# Patient Record
Sex: FEMALE | Race: WHITE | Employment: FULL TIME | URBAN - METROPOLITAN AREA
[De-identification: names, ages, dates, MRNs, and addresses within clinical notes are randomized per-mention and may not be internally consistent; named-entity substitution may affect disease eponyms.]

---

## 2017-03-13 ENCOUNTER — APPOINTMENT (OUTPATIENT)
Dept: CT IMAGING | Age: 47
End: 2017-03-13
Payer: COMMERCIAL

## 2017-03-13 ENCOUNTER — HOSPITAL ENCOUNTER (EMERGENCY)
Age: 47
Discharge: HOME | End: 2017-03-13
Attending: EMERGENCY MEDICINE
Payer: COMMERCIAL

## 2017-03-13 VITALS
HEART RATE: 74 BPM | HEIGHT: 62 IN | BODY MASS INDEX: 25.95 KG/M2 | OXYGEN SATURATION: 100 % | WEIGHT: 141 LBS | DIASTOLIC BLOOD PRESSURE: 80 MMHG | SYSTOLIC BLOOD PRESSURE: 134 MMHG | RESPIRATION RATE: 20 BRPM | TEMPERATURE: 98.9 F

## 2017-03-13 DIAGNOSIS — S09.90XA CLOSED HEAD INJURY, INITIAL ENCOUNTER: Primary | ICD-10-CM

## 2017-03-13 PROCEDURE — 99283 EMERGENCY DEPT VISIT LOW MDM: CPT

## 2017-03-13 PROCEDURE — 70450 CT HEAD/BRAIN W/O DYE: CPT

## 2017-03-13 PROCEDURE — 6360000002 HC RX W HCPCS: Performed by: EMERGENCY MEDICINE

## 2017-03-13 RX ORDER — ONDANSETRON 4 MG/1
4 TABLET, ORALLY DISINTEGRATING ORAL ONCE
Status: COMPLETED | OUTPATIENT
Start: 2017-03-13 | End: 2017-03-13

## 2017-03-13 RX ORDER — ONDANSETRON 4 MG/1
4 TABLET, ORALLY DISINTEGRATING ORAL EVERY 8 HOURS PRN
Qty: 10 TABLET | Refills: 0 | Status: SHIPPED | OUTPATIENT
Start: 2017-03-13 | End: 2018-08-01

## 2017-03-13 RX ADMIN — ONDANSETRON 4 MG: 4 TABLET, ORALLY DISINTEGRATING ORAL at 14:17

## 2017-03-13 ASSESSMENT — ENCOUNTER SYMPTOMS
EYE REDNESS: 0
SHORTNESS OF BREATH: 0
WHEEZING: 0
TROUBLE SWALLOWING: 0
SORE THROAT: 0
COUGH: 0
ABDOMINAL PAIN: 0
CHEST TIGHTNESS: 0
VOICE CHANGE: 0
CONSTIPATION: 0
CHOKING: 0
FACIAL SWELLING: 0
EYE PAIN: 0
BACK PAIN: 0
BLOOD IN STOOL: 0
SINUS PRESSURE: 0
DIARRHEA: 0
VOMITING: 0
EYE DISCHARGE: 0
STRIDOR: 0

## 2017-03-13 ASSESSMENT — PAIN DESCRIPTION - DESCRIPTORS: DESCRIPTORS: ACHING;DULL

## 2017-03-13 ASSESSMENT — PAIN DESCRIPTION - PAIN TYPE: TYPE: ACUTE PAIN

## 2017-03-13 ASSESSMENT — PAIN DESCRIPTION - FREQUENCY: FREQUENCY: CONTINUOUS

## 2017-03-13 ASSESSMENT — PAIN SCALES - GENERAL
PAINLEVEL_OUTOF10: 6
PAINLEVEL_OUTOF10: 1

## 2017-03-13 ASSESSMENT — PAIN DESCRIPTION - ONSET: ONSET: SUDDEN

## 2017-03-13 ASSESSMENT — PAIN DESCRIPTION - ORIENTATION: ORIENTATION: ANTERIOR

## 2017-03-13 ASSESSMENT — PAIN DESCRIPTION - PROGRESSION: CLINICAL_PROGRESSION: NOT CHANGED

## 2017-03-13 ASSESSMENT — PAIN DESCRIPTION - LOCATION
LOCATION: HEAD
LOCATION: HEAD

## 2017-03-15 ENCOUNTER — OFFICE VISIT (OUTPATIENT)
Dept: FAMILY MEDICINE CLINIC | Age: 47
End: 2017-03-15

## 2017-03-15 VITALS
RESPIRATION RATE: 16 BRPM | DIASTOLIC BLOOD PRESSURE: 78 MMHG | WEIGHT: 146 LBS | SYSTOLIC BLOOD PRESSURE: 122 MMHG | BODY MASS INDEX: 24.32 KG/M2 | HEIGHT: 65 IN | OXYGEN SATURATION: 99 % | HEART RATE: 70 BPM

## 2017-03-15 DIAGNOSIS — F07.81 POST-CONCUSSION SYNDROME: Primary | ICD-10-CM

## 2017-03-15 PROCEDURE — 99213 OFFICE O/P EST LOW 20 MIN: CPT | Performed by: PHYSICIAN ASSISTANT

## 2017-03-15 RX ORDER — MECLIZINE HYDROCHLORIDE 25 MG/1
25 TABLET ORAL 3 TIMES DAILY PRN
Qty: 30 TABLET | Refills: 1 | Status: SHIPPED | OUTPATIENT
Start: 2017-03-15 | End: 2017-03-25

## 2017-03-15 ASSESSMENT — ENCOUNTER SYMPTOMS
SHORTNESS OF BREATH: 0
COUGH: 0
EYE PAIN: 0

## 2017-06-07 ENCOUNTER — OFFICE VISIT (OUTPATIENT)
Dept: FAMILY MEDICINE CLINIC | Age: 47
End: 2017-06-07

## 2017-06-07 VITALS
BODY MASS INDEX: 25.58 KG/M2 | HEART RATE: 70 BPM | DIASTOLIC BLOOD PRESSURE: 70 MMHG | OXYGEN SATURATION: 99 % | WEIGHT: 139 LBS | SYSTOLIC BLOOD PRESSURE: 110 MMHG | HEIGHT: 62 IN

## 2017-06-07 DIAGNOSIS — M65.4 DE QUERVAIN'S TENOSYNOVITIS, RIGHT: Primary | ICD-10-CM

## 2017-06-07 PROCEDURE — 99213 OFFICE O/P EST LOW 20 MIN: CPT | Performed by: FAMILY MEDICINE

## 2017-06-07 ASSESSMENT — ENCOUNTER SYMPTOMS
ABDOMINAL PAIN: 0
CONSTIPATION: 0
COUGH: 0
DIARRHEA: 0
SORE THROAT: 0
SHORTNESS OF BREATH: 0
WHEEZING: 0
RHINORRHEA: 0

## 2017-06-21 ENCOUNTER — OFFICE VISIT (OUTPATIENT)
Dept: FAMILY MEDICINE CLINIC | Age: 47
End: 2017-06-21

## 2017-06-21 VITALS
BODY MASS INDEX: 25.95 KG/M2 | SYSTOLIC BLOOD PRESSURE: 102 MMHG | DIASTOLIC BLOOD PRESSURE: 60 MMHG | WEIGHT: 141 LBS | HEART RATE: 79 BPM | HEIGHT: 62 IN | OXYGEN SATURATION: 100 %

## 2017-06-21 DIAGNOSIS — M65.4 DE QUERVAIN'S TENOSYNOVITIS: Primary | ICD-10-CM

## 2017-06-21 PROCEDURE — 20605 DRAIN/INJ JOINT/BURSA W/O US: CPT | Performed by: FAMILY MEDICINE

## 2017-06-21 PROCEDURE — 99213 OFFICE O/P EST LOW 20 MIN: CPT | Performed by: FAMILY MEDICINE

## 2017-06-21 RX ORDER — TRIAMCINOLONE ACETONIDE 40 MG/ML
40 INJECTION, SUSPENSION INTRA-ARTICULAR; INTRAMUSCULAR ONCE
Status: SHIPPED | OUTPATIENT
Start: 2017-06-21

## 2017-07-10 ENCOUNTER — TELEPHONE (OUTPATIENT)
Dept: FAMILY MEDICINE CLINIC | Age: 47
End: 2017-07-10

## 2017-07-12 ENCOUNTER — OFFICE VISIT (OUTPATIENT)
Dept: FAMILY MEDICINE CLINIC | Age: 47
End: 2017-07-12

## 2017-07-12 VITALS
DIASTOLIC BLOOD PRESSURE: 68 MMHG | BODY MASS INDEX: 26.16 KG/M2 | TEMPERATURE: 98.7 F | SYSTOLIC BLOOD PRESSURE: 108 MMHG | WEIGHT: 143 LBS | OXYGEN SATURATION: 97 % | HEART RATE: 72 BPM

## 2017-07-12 DIAGNOSIS — R42 DIZZINESS: Primary | ICD-10-CM

## 2017-07-12 PROCEDURE — 99213 OFFICE O/P EST LOW 20 MIN: CPT | Performed by: FAMILY MEDICINE

## 2017-07-12 ASSESSMENT — ENCOUNTER SYMPTOMS
DIARRHEA: 0
RHINORRHEA: 0
COUGH: 0
ABDOMINAL PAIN: 0
WHEEZING: 0
SHORTNESS OF BREATH: 1
CONSTIPATION: 0
SORE THROAT: 0

## 2017-07-19 ENCOUNTER — TELEPHONE (OUTPATIENT)
Dept: FAMILY MEDICINE CLINIC | Age: 47
End: 2017-07-19

## 2018-03-13 ENCOUNTER — OFFICE VISIT (OUTPATIENT)
Dept: FAMILY MEDICINE CLINIC | Age: 48
End: 2018-03-13
Payer: COMMERCIAL

## 2018-03-13 VITALS
BODY MASS INDEX: 27.46 KG/M2 | HEIGHT: 62 IN | DIASTOLIC BLOOD PRESSURE: 76 MMHG | OXYGEN SATURATION: 97 % | TEMPERATURE: 98.1 F | WEIGHT: 149.2 LBS | HEART RATE: 83 BPM | SYSTOLIC BLOOD PRESSURE: 110 MMHG

## 2018-03-13 DIAGNOSIS — M25.562 ACUTE PAIN OF LEFT KNEE: Primary | ICD-10-CM

## 2018-03-13 PROCEDURE — G8419 CALC BMI OUT NRM PARAM NOF/U: HCPCS | Performed by: FAMILY MEDICINE

## 2018-03-13 PROCEDURE — G8427 DOCREV CUR MEDS BY ELIG CLIN: HCPCS | Performed by: FAMILY MEDICINE

## 2018-03-13 PROCEDURE — G8484 FLU IMMUNIZE NO ADMIN: HCPCS | Performed by: FAMILY MEDICINE

## 2018-03-13 PROCEDURE — 99213 OFFICE O/P EST LOW 20 MIN: CPT | Performed by: FAMILY MEDICINE

## 2018-03-13 PROCEDURE — 1036F TOBACCO NON-USER: CPT | Performed by: FAMILY MEDICINE

## 2018-03-13 ASSESSMENT — PATIENT HEALTH QUESTIONNAIRE - PHQ9
2. FEELING DOWN, DEPRESSED OR HOPELESS: 0
SUM OF ALL RESPONSES TO PHQ QUESTIONS 1-9: 0
SUM OF ALL RESPONSES TO PHQ9 QUESTIONS 1 & 2: 0
1. LITTLE INTEREST OR PLEASURE IN DOING THINGS: 0

## 2018-03-13 ASSESSMENT — ENCOUNTER SYMPTOMS
ABDOMINAL PAIN: 0
RHINORRHEA: 0
SORE THROAT: 0
CONSTIPATION: 0
COUGH: 0
WHEEZING: 0
DIARRHEA: 0
SHORTNESS OF BREATH: 0

## 2018-08-01 ENCOUNTER — OFFICE VISIT (OUTPATIENT)
Dept: FAMILY MEDICINE CLINIC | Age: 48
End: 2018-08-01
Payer: COMMERCIAL

## 2018-08-01 VITALS
SYSTOLIC BLOOD PRESSURE: 114 MMHG | WEIGHT: 147.8 LBS | TEMPERATURE: 98.3 F | HEART RATE: 72 BPM | OXYGEN SATURATION: 100 % | BODY MASS INDEX: 27.03 KG/M2 | DIASTOLIC BLOOD PRESSURE: 72 MMHG

## 2018-08-01 DIAGNOSIS — M67.441 GANGLION CYST OF FINGER OF RIGHT HAND: Primary | ICD-10-CM

## 2018-08-01 PROCEDURE — G8427 DOCREV CUR MEDS BY ELIG CLIN: HCPCS | Performed by: FAMILY MEDICINE

## 2018-08-01 PROCEDURE — 99213 OFFICE O/P EST LOW 20 MIN: CPT | Performed by: FAMILY MEDICINE

## 2018-08-01 PROCEDURE — 1036F TOBACCO NON-USER: CPT | Performed by: FAMILY MEDICINE

## 2018-08-01 PROCEDURE — G8419 CALC BMI OUT NRM PARAM NOF/U: HCPCS | Performed by: FAMILY MEDICINE

## 2018-08-01 RX ORDER — OMEGA-3S/DHA/EPA/FISH OIL/D3 300MG-1000
400 CAPSULE ORAL DAILY
COMMUNITY
End: 2020-12-16

## 2018-08-01 ASSESSMENT — ENCOUNTER SYMPTOMS
WHEEZING: 0
CONSTIPATION: 0
SORE THROAT: 0
RHINORRHEA: 0
ABDOMINAL PAIN: 0
SHORTNESS OF BREATH: 0
COUGH: 0
DIARRHEA: 0

## 2018-08-01 NOTE — PROGRESS NOTES
Negative for cough, shortness of breath and wheezing. Gastrointestinal: Negative for abdominal pain, constipation and diarrhea. Endocrine: Negative for polydipsia and polyuria. Genitourinary: Negative for dysuria, frequency and urgency. Skin: Positive for rash. Neurological: Negative for syncope, light-headedness, numbness and headaches. Psychiatric/Behavioral: Negative for sleep disturbance. The patient is not nervous/anxious. Vitals:    08/01/18 1400   BP: 114/72   Site: Right Arm   Position: Sitting   Cuff Size: Medium Adult   Pulse: 72   Temp: 98.3 °F (36.8 °C)   TempSrc: Oral   SpO2: 100%   Weight: 147 lb 12.8 oz (67 kg)       Physical exam:  Physical Exam   Constitutional: She is oriented to person, place, and time. She appears well-developed and well-nourished. No distress. HENT:   Head: Normocephalic and atraumatic. Eyes: EOM are normal.   Neck: Normal range of motion. Cardiovascular: Normal rate. Pulmonary/Chest: Effort normal. No respiratory distress. Musculoskeletal: She exhibits no edema. Hands:  Neurological: She is alert and oriented to person, place, and time. Skin: Skin is warm and dry. Psychiatric: She has a normal mood and affect. Her behavior is normal.   Vitals reviewed. Assessment/Plan:  52 y.o. female here mainly for Ganglionic cyst on finger:  - Sending to ortho     Diagnosis Orders   1. Ganglion cyst of finger of right hand  Amb External Referral To Orthopedic Surgery        Return if symptoms worsen or fail to improve.     Paula Easley MD

## 2018-10-03 ENCOUNTER — OFFICE VISIT (OUTPATIENT)
Dept: FAMILY MEDICINE CLINIC | Age: 48
End: 2018-10-03
Payer: COMMERCIAL

## 2018-10-03 VITALS
DIASTOLIC BLOOD PRESSURE: 72 MMHG | WEIGHT: 145 LBS | HEART RATE: 72 BPM | BODY MASS INDEX: 27.38 KG/M2 | HEIGHT: 61 IN | OXYGEN SATURATION: 99 % | SYSTOLIC BLOOD PRESSURE: 126 MMHG

## 2018-10-03 DIAGNOSIS — M65.4 DE QUERVAIN'S TENOSYNOVITIS, RIGHT: Primary | ICD-10-CM

## 2018-10-03 PROCEDURE — G8419 CALC BMI OUT NRM PARAM NOF/U: HCPCS | Performed by: FAMILY MEDICINE

## 2018-10-03 PROCEDURE — 99213 OFFICE O/P EST LOW 20 MIN: CPT | Performed by: FAMILY MEDICINE

## 2018-10-03 PROCEDURE — G8427 DOCREV CUR MEDS BY ELIG CLIN: HCPCS | Performed by: FAMILY MEDICINE

## 2018-10-03 PROCEDURE — 20600 DRAIN/INJ JOINT/BURSA W/O US: CPT | Performed by: FAMILY MEDICINE

## 2018-10-03 PROCEDURE — 1036F TOBACCO NON-USER: CPT | Performed by: FAMILY MEDICINE

## 2018-10-03 PROCEDURE — G8484 FLU IMMUNIZE NO ADMIN: HCPCS | Performed by: FAMILY MEDICINE

## 2018-10-03 RX ORDER — TRIAMCINOLONE ACETONIDE 40 MG/ML
40 INJECTION, SUSPENSION INTRA-ARTICULAR; INTRAMUSCULAR ONCE
Status: COMPLETED | OUTPATIENT
Start: 2018-10-03 | End: 2018-10-03

## 2018-10-03 RX ADMIN — TRIAMCINOLONE ACETONIDE 40 MG: 40 INJECTION, SUSPENSION INTRA-ARTICULAR; INTRAMUSCULAR at 16:12

## 2018-10-03 ASSESSMENT — ENCOUNTER SYMPTOMS
COUGH: 0
ABDOMINAL PAIN: 0
SORE THROAT: 0
WHEEZING: 0
DIARRHEA: 0
SHORTNESS OF BREATH: 0
CONSTIPATION: 0
RHINORRHEA: 0

## 2019-02-20 ENCOUNTER — OFFICE VISIT (OUTPATIENT)
Dept: OBGYN CLINIC | Age: 49
End: 2019-02-20
Payer: COMMERCIAL

## 2019-02-20 VITALS
SYSTOLIC BLOOD PRESSURE: 112 MMHG | DIASTOLIC BLOOD PRESSURE: 74 MMHG | BODY MASS INDEX: 27.94 KG/M2 | HEIGHT: 61 IN | WEIGHT: 148 LBS

## 2019-02-20 DIAGNOSIS — Z12.31 SCREENING MAMMOGRAM, ENCOUNTER FOR: ICD-10-CM

## 2019-02-20 DIAGNOSIS — R68.82 DECREASED SEX DRIVE: ICD-10-CM

## 2019-02-20 DIAGNOSIS — R45.86 MOOD SWINGS: ICD-10-CM

## 2019-02-20 DIAGNOSIS — Z11.51 SCREENING FOR HUMAN PAPILLOMAVIRUS: ICD-10-CM

## 2019-02-20 DIAGNOSIS — Z01.419 PAP SMEAR, AS PART OF ROUTINE GYNECOLOGICAL EXAMINATION: Primary | ICD-10-CM

## 2019-02-20 PROCEDURE — G8419 CALC BMI OUT NRM PARAM NOF/U: HCPCS | Performed by: OBSTETRICS & GYNECOLOGY

## 2019-02-20 PROCEDURE — 99213 OFFICE O/P EST LOW 20 MIN: CPT | Performed by: OBSTETRICS & GYNECOLOGY

## 2019-02-20 PROCEDURE — G8427 DOCREV CUR MEDS BY ELIG CLIN: HCPCS | Performed by: OBSTETRICS & GYNECOLOGY

## 2019-02-20 PROCEDURE — G8484 FLU IMMUNIZE NO ADMIN: HCPCS | Performed by: OBSTETRICS & GYNECOLOGY

## 2019-02-20 PROCEDURE — 99396 PREV VISIT EST AGE 40-64: CPT | Performed by: OBSTETRICS & GYNECOLOGY

## 2019-02-20 PROCEDURE — 1036F TOBACCO NON-USER: CPT | Performed by: OBSTETRICS & GYNECOLOGY

## 2019-02-20 RX ORDER — VALERIAN ROOT 500 MG
CAPSULE ORAL
COMMUNITY

## 2019-02-20 ASSESSMENT — ENCOUNTER SYMPTOMS
ALLERGIC/IMMUNOLOGIC NEGATIVE: 1
EYES NEGATIVE: 1
ANAL BLEEDING: 0
RECTAL PAIN: 0
CONSTIPATION: 0
VOMITING: 0
ABDOMINAL PAIN: 0
BLOOD IN STOOL: 0
DIARRHEA: 0
ABDOMINAL DISTENTION: 0
NAUSEA: 0
RESPIRATORY NEGATIVE: 1

## 2019-02-20 ASSESSMENT — PATIENT HEALTH QUESTIONNAIRE - PHQ9
SUM OF ALL RESPONSES TO PHQ QUESTIONS 1-9: 0
SUM OF ALL RESPONSES TO PHQ9 QUESTIONS 1 & 2: 0
1. LITTLE INTEREST OR PLEASURE IN DOING THINGS: 0
2. FEELING DOWN, DEPRESSED OR HOPELESS: 0
SUM OF ALL RESPONSES TO PHQ QUESTIONS 1-9: 0

## 2019-02-26 DIAGNOSIS — Z01.419 PAP SMEAR, AS PART OF ROUTINE GYNECOLOGICAL EXAMINATION: ICD-10-CM

## 2019-02-26 DIAGNOSIS — Z11.51 SCREENING FOR HUMAN PAPILLOMAVIRUS: ICD-10-CM

## 2019-03-13 ENCOUNTER — HOSPITAL ENCOUNTER (OUTPATIENT)
Dept: WOMENS IMAGING | Age: 49
Discharge: HOME | End: 2019-03-15
Payer: COMMERCIAL

## 2019-03-13 DIAGNOSIS — Z12.31 SCREENING MAMMOGRAM, ENCOUNTER FOR: ICD-10-CM

## 2019-03-13 PROCEDURE — 77067 SCR MAMMO BI INCL CAD: CPT

## 2019-03-15 ENCOUNTER — TELEPHONE (OUTPATIENT)
Dept: ADMINISTRATIVE | Age: 49
End: 2019-03-15

## 2019-03-27 ENCOUNTER — OFFICE VISIT (OUTPATIENT)
Dept: FAMILY MEDICINE CLINIC | Age: 49
End: 2019-03-27
Payer: COMMERCIAL

## 2019-03-27 ENCOUNTER — HOSPITAL ENCOUNTER (OUTPATIENT)
Age: 49
Setting detail: SPECIMEN
Discharge: HOME | End: 2019-03-27
Payer: COMMERCIAL

## 2019-03-27 VITALS
SYSTOLIC BLOOD PRESSURE: 106 MMHG | OXYGEN SATURATION: 99 % | DIASTOLIC BLOOD PRESSURE: 72 MMHG | BODY MASS INDEX: 28.36 KG/M2 | HEIGHT: 61 IN | WEIGHT: 150.2 LBS | HEART RATE: 78 BPM

## 2019-03-27 DIAGNOSIS — Z00.00 ANNUAL PHYSICAL EXAM: Primary | ICD-10-CM

## 2019-03-27 DIAGNOSIS — Z00.00 ANNUAL PHYSICAL EXAM: ICD-10-CM

## 2019-03-27 LAB
CHOLESTEROL, FASTING: 183 MG/DL (ref 0–199)
HDLC SERPL-MCNC: 68 MG/DL (ref 40–59)
LDL CHOLESTEROL CALCULATED: 106 MG/DL (ref 0–129)
TRIGLYCERIDE, FASTING: 46 MG/DL (ref 0–150)

## 2019-03-27 PROCEDURE — G8484 FLU IMMUNIZE NO ADMIN: HCPCS | Performed by: FAMILY MEDICINE

## 2019-03-27 PROCEDURE — 99396 PREV VISIT EST AGE 40-64: CPT | Performed by: FAMILY MEDICINE

## 2019-03-27 PROCEDURE — 80061 LIPID PANEL: CPT

## 2019-03-27 ASSESSMENT — ENCOUNTER SYMPTOMS
ABDOMINAL PAIN: 0
SHORTNESS OF BREATH: 0
CONSTIPATION: 0
SORE THROAT: 0
COUGH: 0
WHEEZING: 0
RHINORRHEA: 0
DIARRHEA: 0

## 2019-10-25 ENCOUNTER — OFFICE VISIT (OUTPATIENT)
Dept: INTERNAL MEDICINE | Age: 49
End: 2019-10-25
Payer: COMMERCIAL

## 2019-10-25 VITALS
HEART RATE: 79 BPM | SYSTOLIC BLOOD PRESSURE: 120 MMHG | OXYGEN SATURATION: 99 % | WEIGHT: 150 LBS | BODY MASS INDEX: 28.34 KG/M2 | DIASTOLIC BLOOD PRESSURE: 72 MMHG

## 2019-10-25 DIAGNOSIS — M65.4 TENOSYNOVITIS, DE QUERVAIN: Primary | ICD-10-CM

## 2019-10-25 PROCEDURE — G8484 FLU IMMUNIZE NO ADMIN: HCPCS | Performed by: FAMILY MEDICINE

## 2019-10-25 PROCEDURE — 99213 OFFICE O/P EST LOW 20 MIN: CPT | Performed by: FAMILY MEDICINE

## 2019-10-25 PROCEDURE — G8419 CALC BMI OUT NRM PARAM NOF/U: HCPCS | Performed by: FAMILY MEDICINE

## 2019-10-25 PROCEDURE — 1036F TOBACCO NON-USER: CPT | Performed by: FAMILY MEDICINE

## 2019-10-25 PROCEDURE — 20550 NJX 1 TENDON SHEATH/LIGAMENT: CPT | Performed by: FAMILY MEDICINE

## 2019-10-25 PROCEDURE — G8427 DOCREV CUR MEDS BY ELIG CLIN: HCPCS | Performed by: FAMILY MEDICINE

## 2019-10-25 RX ORDER — TRIAMCINOLONE ACETONIDE 40 MG/ML
20 INJECTION, SUSPENSION INTRA-ARTICULAR; INTRAMUSCULAR ONCE
Status: COMPLETED | OUTPATIENT
Start: 2019-10-25 | End: 2019-10-25

## 2019-10-25 RX ADMIN — TRIAMCINOLONE ACETONIDE 20 MG: 40 INJECTION, SUSPENSION INTRA-ARTICULAR; INTRAMUSCULAR at 11:59

## 2019-10-25 ASSESSMENT — ENCOUNTER SYMPTOMS
COUGH: 0
CONSTIPATION: 0
SHORTNESS OF BREATH: 0
WHEEZING: 0
DIARRHEA: 0
ABDOMINAL PAIN: 0
SORE THROAT: 0
RHINORRHEA: 0

## 2020-05-11 ENCOUNTER — HOSPITAL ENCOUNTER (EMERGENCY)
Age: 50
Discharge: HOME | End: 2020-05-11
Attending: EMERGENCY MEDICINE
Payer: COMMERCIAL

## 2020-05-11 VITALS
TEMPERATURE: 97.6 F | RESPIRATION RATE: 18 BRPM | BODY MASS INDEX: 29.25 KG/M2 | SYSTOLIC BLOOD PRESSURE: 140 MMHG | HEART RATE: 89 BPM | DIASTOLIC BLOOD PRESSURE: 96 MMHG | OXYGEN SATURATION: 98 % | HEIGHT: 60 IN | WEIGHT: 149 LBS

## 2020-05-11 PROCEDURE — 99282 EMERGENCY DEPT VISIT SF MDM: CPT

## 2020-05-12 NOTE — ED PROVIDER NOTES
documented in HPI. Musculoskeletal symptoms:  Negative except as documented in HPI. Neurologic symptoms:  Negative except as documented in HPI. Remainder of 10 systems, all negative except for mentioned above      Except as noted above the remainder of the review of systems was reviewed and negative. PAST MEDICAL HISTORY     Past Medical History:   Diagnosis Date    Healthy adult on routine physical examination          SURGICALHISTORY       Past Surgical History:   Procedure Laterality Date    APPENDECTOMY           CURRENT MEDICATIONS       Previous Medications    MULTIPLE VITAMINS-MINERALS (MULTIVITAMIN PO)    Take  by mouth. NUTRITIONAL SUPPLEMENTS (ESTROVEN PO)    Take by mouth    ST TAVAREZ WORT 150 MG CAPS    Take 2 capsules by mouth daily    VALERIAN 500 MG CAPS    Take by mouth    VITAMIN D3 (CHOLECALCIFEROL) 400 UNITS TABS TABLET    Take 400 Units by mouth daily       ALLERGIES     Patient has no known allergies.     FAMILY HISTORY       Family History   Problem Relation Age of Onset    Other Mother         benign tumor on optic nerve    Cancer Father 64        lung    Heart Disease Maternal Grandmother     Other Maternal Grandfather         COPD    Other Paternal Grandmother         annurysm    Other Paternal Grandfather         alzheimers    Stroke Paternal Grandfather     Breast Cancer Neg Hx     Colon Cancer Neg Hx     Diabetes Neg Hx     Eclampsia Neg Hx     Hypertension Neg Hx     Ovarian Cancer Neg Hx      Labor Neg Hx     Spont Abortions Neg Hx           SOCIAL HISTORY       Social History     Socioeconomic History    Marital status:      Spouse name: None    Number of children: None    Years of education: None    Highest education level: None   Occupational History    None   Social Needs    Financial resource strain: None    Food insecurity     Worry: None     Inability: None    Transportation needs     Medical: None     Non-medical: None Tobacco Use    Smoking status: Former Smoker     Last attempt to quit: 2010     Years since quittin.7    Smokeless tobacco: Never Used   Substance and Sexual Activity    Alcohol use: Yes     Alcohol/week: 8.3 standard drinks     Types: 10 Standard drinks or equivalent per week    Drug use: No    Sexual activity: Yes     Partners: Male     Comment: foam   Lifestyle    Physical activity     Days per week: None     Minutes per session: None    Stress: None   Relationships    Social connections     Talks on phone: None     Gets together: None     Attends [employer] service: None     Active member of club or organization: None     Attends meetings of clubs or organizations: None     Relationship status: None    Intimate partner violence     Fear of current or ex partner: None     Emotionally abused: None     Physically abused: None     Forced sexual activity: None   Other Topics Concern    None   Social History Narrative    None       SCREENINGS      @FLOW([id number])@      PHYSICAL EXAM    (up to 7 for level 4, 8 or more for level 5)     ED Triage Vitals [20]   BP Temp Temp Source Pulse Resp SpO2 Height Weight   (!) 140/96 97.6 °F (36.4 °C) Temporal 89 18 98 % 5' (1.524 m) 149 lb (67.6 kg)       Physical Exam     CONST: -Well-developed well-nourished ;                -In no acute distress. -Vitals reviewed. EYES: -EOM intact, KEITH:              -Sclera normal and conjunctiva: clear bilaterally. ENT: - Normal pharynx pink and moist.    NECK: -Supple (chin-to-chest).     CARD: -Rate and rhythm: Regular              -Murmurs: No  RESP: -Respiratory effort and chest excursion with respirations: Normal             -Breath sounds equal bilaterally: Clear             -Wheezes: No             -Rales: No    BACK: -Flank pain: No              -Pain on palpation: No    ABD: -Distended: No           -Bruits: No           -Bowel sounds: Normal.           -Deep palpation: Non-tender -Organomegaly palpable: No           -Abnormal masses: No    EXT: Gross appearance and use of all four extremities: Normal     SKIN: -Good turgor warm and dry. -Apparent lesions or rashes: No    NEURO: -Patient: alert                -Oriented to: person, place and time. -Appearance and judgment: appropriate.                -Cranial Nerves: Normal.                -Speech: Normal          DIAGNOSTIC RESULTS     EKG: All EKG's are interpreted by the Emergency Department Physician who either signs or Co-signsthis chart in the absence of a cardiologist.        RADIOLOGY:   Geryl Angst such as CT, Ultrasound and MRI are read by the radiologist. Plain radiographic images are visualized and preliminarily interpreted by the emergency physician with the below findings:        Interpretation per the Radiologist below, if available at the time ofthis note:    No orders to display         ED BEDSIDE ULTRASOUND:   Performed by ED Physician - none    LABS:  Labs Reviewed - No data to display    All other labs were within normal range or not returned as of this dictation. EMERGENCY DEPARTMENT COURSE and DIFFERENTIAL DIAGNOSIS/MDM:   Vitals:    Vitals:    05/11/20 2010   BP: (!) 140/96   Pulse: 89   Resp: 18   Temp: 97.6 °F (36.4 °C)   TempSrc: Temporal   SpO2: 98%   Weight: 149 lb (67.6 kg)   Height: 5' (1.524 m)           MDM       Patient was able to drink 2 glasses of water while I was sitting observing her, I auscultated the esophagus while she drank, there is no sign of regurgitation, there is no sign of bleeding. I believe this is an irritated esophagus from that where the foreign body was, I cannot imagine that this is anything close to her esophageal rougher rupture nonetheless she will be seen by /emergency department if she gets more than 6 out of 10 pain. Currently her pain is 0-1 out of 10.   She is to see her doctor in a couple of days      CRITICAL CARE TIME   Total Critical Care time was  minutes, excluding separately reportableprocedures. There was a high probability of clinicallysignificant/life threatening deterioration in the patient's condition which required my urgent intervention. CONSULTS:  None    PROCEDURES:  Unless otherwise noted below, none     Procedures    FINAL IMPRESSION      1.  Foreign body in esophagus, initial encounter          DISPOSITION/PLAN   DISPOSITION Decision To Discharge 05/11/2020 08:39:48 PM      PATIENT REFERRED TO:  Angela Stout MD  [address] [facility] [address]  [phone]    In 2 days        DISCHARGE MEDICATIONS:  New Prescriptions    No medications on file          (Please note that portions of this note were completed with a voice recognition program.  Efforts were made to edit the dictations but occasionally words are mis-transcribed.)    Apoorva Amaya MD (electronically signed)  Attending Emergency Physician          Apoorva Amaya MD  05/11/20 2040

## 2020-12-06 ENCOUNTER — VIRTUAL VISIT (OUTPATIENT)
Dept: FAMILY MEDICINE CLINIC | Age: 50
End: 2020-12-06
Payer: COMMERCIAL

## 2020-12-06 DIAGNOSIS — Z20.822 EXPOSURE TO COVID-19 VIRUS: ICD-10-CM

## 2020-12-06 PROCEDURE — G8427 DOCREV CUR MEDS BY ELIG CLIN: HCPCS | Performed by: NURSE PRACTITIONER

## 2020-12-06 PROCEDURE — 3017F COLORECTAL CA SCREEN DOC REV: CPT | Performed by: NURSE PRACTITIONER

## 2020-12-06 PROCEDURE — 99213 OFFICE O/P EST LOW 20 MIN: CPT | Performed by: NURSE PRACTITIONER

## 2020-12-06 ASSESSMENT — ENCOUNTER SYMPTOMS
SINUS PAIN: 0
DIARRHEA: 0
CHEST TIGHTNESS: 0
SINUS PRESSURE: 0
WHEEZING: 0
RHINORRHEA: 1
SHORTNESS OF BREATH: 0
NAUSEA: 0
COUGH: 0
ABDOMINAL PAIN: 0
SORE THROAT: 0

## 2020-12-06 ASSESSMENT — PATIENT HEALTH QUESTIONNAIRE - PHQ9
SUM OF ALL RESPONSES TO PHQ QUESTIONS 1-9: 0
SUM OF ALL RESPONSES TO PHQ QUESTIONS 1-9: 0
SUM OF ALL RESPONSES TO PHQ9 QUESTIONS 1 & 2: 0
2. FEELING DOWN, DEPRESSED OR HOPELESS: 0
1. LITTLE INTEREST OR PLEASURE IN DOING THINGS: 0
SUM OF ALL RESPONSES TO PHQ QUESTIONS 1-9: 0

## 2020-12-06 NOTE — PROGRESS NOTES
TELEHEALTH EVALUATION -- Audio/Visual (During UUPGA-22 public health emergency)    -   Xander Salazar is a 48 y.o. female being evaluated by a Virtual Visit (video visit) encounter to address concerns as mentioned above. A caregiver was present when appropriate. Due to this being a TeleHealth encounter (During EGIEV-63 public health emergency), evaluation of the following organ systems was limited: Vitals/Constitutional/EENT/Resp/CV/GI//MS/Neuro/Skin/Heme-Lymph-Imm. Pursuant to the emergency declaration under the [address] [place] [address], [address] [place] [address] authority and the [employer] Act, this Virtual Visit was conducted with patient's (and/or legal guardian's) consent, to reduce the patient's risk of exposure to COVID-19 and provide necessary medical care. The patient (and/or legal guardian) has also been advised to contact this office for worsening conditions or problems, and seek emergency medical treatment and/or call 911 if deemed necessary. Patient was contacted and agreed to proceed with a virtual visit via Telephone Visit  The risks and benefits of converting to a virtual visit were discussed in light of the current infectious disease epidemic. Patient also understood that insurance coverage and co-pays are up to their individual insurance plans. Patient was located at their home. Provider was located at their office. 2020  Xander Salazar (:  1970) has requested an audio/video evaluation for the following concern(s):    HPI  VV audio for COVID-19 testing   Woke up early this morning with body aches & h/a  Has lost sense of taste and smell   Eating and drinking well   Doesn't have a thermometer   Her  is ill as well   Unsure of sick contacts for either of them           Review of Systems   Constitutional: Positive for activity change, chills, diaphoresis and fatigue. Negative for appetite change.  Fever: Comment: foam   Lifestyle    Physical activity     Days per week: Not on file     Minutes per session: Not on file    Stress: Not on file   Relationships    Social connections     Talks on phone: Not on file     Gets together: Not on file     Attends Mu-ism service: Not on file     Active member of club or organization: Not on file     Attends meetings of clubs or organizations: Not on file     Relationship status: Not on file    Intimate partner violence     Fear of current or ex partner: Not on file     Emotionally abused: Not on file     Physically abused: Not on file     Forced sexual activity: Not on file   Other Topics Concern    Not on file   Social History Narrative    Not on file     Family History   Problem Relation Age of Onset    Other Mother         benign tumor on optic nerve    Cancer Father 64        lung    Heart Disease Maternal Grandmother     Other Maternal Grandfather         COPD    Other Paternal Grandmother         annurysm    Other Paternal Grandfather         alzheimers    Stroke Paternal Grandfather     Breast Cancer Neg Hx     Colon Cancer Neg Hx     Diabetes Neg Hx     Eclampsia Neg Hx     Hypertension Neg Hx     Ovarian Cancer Neg Hx      Labor Neg Hx     Spont Abortions Neg Hx      No Known Allergies    PMH, Surgical Hx, Family Hx, and Social Hx reviewed and updated. PHYSICAL EXAMINATION: N/A. VV Audio       ASSESSMENT/PLAN:  Assessment & Plan   Alejandra Gill was seen today for covid testing. Diagnoses and all orders for this visit:    Exposure to COVID-19 virus  -     COVID-19 Ambulatory; Future      Orders Placed This Encounter   Procedures    COVID-19 Ambulatory     Standing Status:   Future     Standing Expiration Date:   2021     Scheduling Instructions:      Saline media preferred given current shortage of viral transport media but both acceptable     Order Specific Question:   Is this test for diagnosis or screening?      Answer:   Diagnosis of ill patient     Order Specific Question:   Symptomatic for COVID-19 as defined by CDC? Answer:   Yes     Order Specific Question:   Date of Symptom Onset     Answer:   12/6/2020     Order Specific Question:   Hospitalized for COVID-19? Answer:   No     Order Specific Question:   Admitted to ICU for COVID-19? Answer:   No     Order Specific Question:   Employed in healthcare setting? Answer:   No     Order Specific Question:   Resident in a congregate (group) care setting? Answer:   No     Order Specific Question:   Pregnant? Answer:   No     Order Specific Question:   Previously tested for COVID-19? Answer:   No     No orders of the defined types were placed in this encounter. There are no discontinued medications. No follow-ups on file. If you experience any of the red flag s/s, seek care at the ER        Reviewed with the patient: current clinical status. Discussed with patient COVID-19 s/s. Pt aware to remain home until she hears from us about the result. Pt instructed on red flag s/s to go to the ER for or to call 911. Pt verbalized understanding. Will update pt with result when it is available. When to call for help  Call 911 anytime you think you may need emergency care. For example, call if:  · You have severe trouble breathing. · You have severe dehydration. I have reviewed the patient's medical history in detail and updated the computerized patient record. Patient identification was verified at the start of the visit: Yes    Total time spent on this encounter: 11 minutes       --DYLAN Kapoor NP on 12/6/2020 at 11:46 AM    An electronic signature was used to authenticate this note.

## 2020-12-08 LAB
SARS-COV-2: NOT DETECTED
SOURCE: NORMAL

## 2020-12-16 ENCOUNTER — TELEPHONE (OUTPATIENT)
Dept: FAMILY MEDICINE CLINIC | Age: 50
End: 2020-12-16

## 2020-12-16 ENCOUNTER — OFFICE VISIT (OUTPATIENT)
Dept: FAMILY MEDICINE CLINIC | Age: 50
End: 2020-12-16
Payer: COMMERCIAL

## 2020-12-16 ENCOUNTER — HOSPITAL ENCOUNTER (OUTPATIENT)
Age: 50
Setting detail: SPECIMEN
Discharge: HOME | End: 2020-12-16
Payer: COMMERCIAL

## 2020-12-16 VITALS
HEART RATE: 68 BPM | OXYGEN SATURATION: 97 % | SYSTOLIC BLOOD PRESSURE: 130 MMHG | WEIGHT: 151.6 LBS | HEIGHT: 60 IN | TEMPERATURE: 97.1 F | BODY MASS INDEX: 29.76 KG/M2 | DIASTOLIC BLOOD PRESSURE: 80 MMHG

## 2020-12-16 PROCEDURE — 83036 HEMOGLOBIN GLYCOSYLATED A1C: CPT

## 2020-12-16 PROCEDURE — 99396 PREV VISIT EST AGE 40-64: CPT | Performed by: FAMILY MEDICINE

## 2020-12-16 PROCEDURE — 80061 LIPID PANEL: CPT

## 2020-12-16 PROCEDURE — 80053 COMPREHEN METABOLIC PANEL: CPT

## 2020-12-16 PROCEDURE — G8484 FLU IMMUNIZE NO ADMIN: HCPCS | Performed by: FAMILY MEDICINE

## 2020-12-16 PROCEDURE — 36415 COLL VENOUS BLD VENIPUNCTURE: CPT | Performed by: FAMILY MEDICINE

## 2020-12-16 ASSESSMENT — ENCOUNTER SYMPTOMS
COUGH: 0
ABDOMINAL PAIN: 0
RHINORRHEA: 0
SORE THROAT: 0
DIARRHEA: 0
WHEEZING: 0
CONSTIPATION: 0
SHORTNESS OF BREATH: 0

## 2020-12-16 NOTE — PROGRESS NOTES
[address] [facility]  [address] [id number]  Dept: [phone]  Dept Fax: : [phone]   Chief Complaint  Chief Complaint   Patient presents with    Annual Exam     fasting for labs today        HPI:  48 y. o.female who presents for annual exam:    Works as a ; quit smoking 8 years ago. Past Medical History:   Diagnosis Date    Healthy adult on routine physical examination      Past Surgical History:   Procedure Laterality Date    APPENDECTOMY       Social History     Socioeconomic History    Marital status:      Spouse name: Not on file    Number of children: Not on file    Years of education: Not on file    Highest education level: Not on file   Occupational History    Not on file   Social Needs    Financial resource strain: Not on file    Food insecurity     Worry: Not on file     Inability: Not on file    Transportation needs     Medical: Not on file     Non-medical: Not on file   Tobacco Use    Smoking status: Former Smoker     Quit date: 8/13/2010     Years since quitting: 10.3    Smokeless tobacco: Never Used   Substance and Sexual Activity    Alcohol use:  Yes     Alcohol/week: 8.3 standard drinks     Types: 10 Standard drinks or equivalent per week    Drug use: No    Sexual activity: Yes     Partners: Male     Comment: foam   Lifestyle    Physical activity     Days per week: Not on file     Minutes per session: Not on file    Stress: Not on file   Relationships    Social connections     Talks on phone: Not on file     Gets together: Not on file     Attends [employer] service: Not on file     Active member of club or organization: Not on file     Attends meetings of clubs or organizations: Not on file     Relationship status: Not on file    Intimate partner violence     Fear of current or ex partner: Not on file Emotionally abused: Not on file     Physically abused: Not on file     Forced sexual activity: Not on file   Other Topics Concern    Not on file   Social History Narrative    Not on file     No Known Allergies  Current Outpatient Medications   Medication Sig Dispense Refill    Valerian 500 MG CAPS Take by mouth      Nutritional Supplements (ESTROVEN PO) Take by mouth      Multiple Vitamins-Minerals (MULTIVITAMIN PO) Take  by mouth. Current Facility-Administered Medications   Medication Dose Route Frequency Provider Last Rate Last Admin    triamcinolone acetonide (KENALOG-40) injection 40 mg  40 mg Intramuscular Once Marcos Cruz MD           ROS:  Review of Systems   Constitutional: Negative for chills and fever. HENT: Negative for rhinorrhea and sore throat. Respiratory: Negative for cough, shortness of breath and wheezing. Gastrointestinal: Negative for abdominal pain, constipation and diarrhea. Endocrine: Negative for polydipsia and polyuria. Genitourinary: Negative for dysuria, frequency and urgency. Neurological: Negative for syncope, light-headedness, numbness and headaches. Psychiatric/Behavioral: Negative for sleep disturbance. The patient is not nervous/anxious. Vitals:    12/16/20 0943   BP: 130/80   Site: Right Upper Arm   Position: Sitting   Cuff Size: Medium Adult   Pulse: 68   Temp: 97.1 °F (36.2 °C)   TempSrc: Infrared   SpO2: 97%   Weight: 151 lb 9.6 oz (68.8 kg)   Height: 5' (1.524 m)       Physical exam:  Physical Exam  Vitals signs reviewed. Constitutional:       General: She is not in acute distress. Appearance: She is well-developed. HENT:      Head: Normocephalic and atraumatic. Right Ear: Tympanic membrane, ear canal and external ear normal. Tympanic membrane is not erythematous. Tympanic membrane has normal mobility. Left Ear: Tympanic membrane, ear canal and external ear normal. Tympanic membrane is not erythematous. Tympanic membrane has normal mobility. Nose: Nose normal.      Mouth/Throat:      Pharynx: No oropharyngeal exudate. Neck:      Musculoskeletal: Normal range of motion. Thyroid: No thyromegaly. Cardiovascular:      Rate and Rhythm: Normal rate and regular rhythm. Heart sounds: Normal heart sounds. No murmur. Pulmonary:      Effort: Pulmonary effort is normal. No respiratory distress. Breath sounds: Normal breath sounds. No wheezing. Abdominal:      General: Bowel sounds are normal. There is no distension. Palpations: Abdomen is soft. Tenderness: There is no abdominal tenderness. There is no guarding or rebound. Lymphadenopathy:      Cervical: No cervical adenopathy. Skin:     General: Skin is warm and dry. Neurological:      Mental Status: She is alert and oriented to person, place, and time. Psychiatric:         Behavior: Behavior normal.         Assessment/Plan:  48 y.o. female here mainly for annual exam:  - routine labs and screenings     Diagnosis Orders   1. Annual physical exam  Lipid, Fasting    Comprehensive Metabolic Panel, Fasting   2. Colon cancer screening  Cologuard   3.  Encounter for screening mammogram for malignant neoplasm of breast  OTILIO DIGITAL SCREEN W OR WO CAD BILATERAL   4. Screening for diabetes mellitus (DM)  Hemoglobin A1C        Return in about 1 year (around 12/16/2021) for annual exam.    Kevin Blakely MD

## 2020-12-16 NOTE — TELEPHONE ENCOUNTER
Pt called she needs a letter saying that she was off work from 12/5 to 12/9 pending her covid test results witch were negative

## 2020-12-17 LAB
ALBUMIN SERPL-MCNC: 4.6 G/DL (ref 3.5–4.6)
ALP BLD-CCNC: 64 U/L (ref 40–130)
ALT SERPL-CCNC: 16 U/L (ref 0–33)
ANION GAP SERPL CALCULATED.3IONS-SCNC: 10 MEQ/L (ref 9–15)
AST SERPL-CCNC: 19 U/L (ref 0–35)
BILIRUB SERPL-MCNC: 0.5 MG/DL (ref 0.2–0.7)
BUN BLDV-MCNC: 8 MG/DL (ref 6–20)
CALCIUM SERPL-MCNC: 9.1 MG/DL (ref 8.5–9.9)
CHLORIDE BLD-SCNC: 101 MEQ/L (ref 95–107)
CHOLESTEROL, FASTING: 187 MG/DL (ref 0–199)
CO2: 23 MEQ/L (ref 20–31)
CREAT SERPL-MCNC: 0.63 MG/DL (ref 0.5–0.9)
GFR AFRICAN AMERICAN: >60
GFR NON-AFRICAN AMERICAN: >60
GLOBULIN: 2 G/DL (ref 2.3–3.5)
GLUCOSE FASTING: 85 MG/DL (ref 70–99)
HBA1C MFR BLD: 5 % (ref 4.8–5.9)
HDLC SERPL-MCNC: 66 MG/DL (ref 40–59)
LDL CHOLESTEROL CALCULATED: 107 MG/DL (ref 0–129)
POTASSIUM SERPL-SCNC: 4.6 MEQ/L (ref 3.4–4.9)
SODIUM BLD-SCNC: 134 MEQ/L (ref 135–144)
TOTAL PROTEIN: 6.6 G/DL (ref 6.3–8)
TRIGLYCERIDE, FASTING: 68 MG/DL (ref 0–150)

## 2021-01-15 ENCOUNTER — TELEPHONE (OUTPATIENT)
Dept: FAMILY MEDICINE CLINIC | Age: 51
End: 2021-01-15

## 2021-01-27 ENCOUNTER — HOSPITAL ENCOUNTER (OUTPATIENT)
Dept: WOMENS IMAGING | Age: 51
Discharge: HOME | End: 2021-01-29
Payer: COMMERCIAL

## 2021-01-27 DIAGNOSIS — Z12.31 ENCOUNTER FOR SCREENING MAMMOGRAM FOR MALIGNANT NEOPLASM OF BREAST: ICD-10-CM

## 2021-01-27 PROCEDURE — 77063 BREAST TOMOSYNTHESIS BI: CPT

## 2021-03-24 ENCOUNTER — OFFICE VISIT (OUTPATIENT)
Dept: FAMILY MEDICINE CLINIC | Age: 51
End: 2021-03-24
Payer: COMMERCIAL

## 2021-03-24 VITALS
OXYGEN SATURATION: 98 % | TEMPERATURE: 97.2 F | WEIGHT: 151.8 LBS | HEART RATE: 67 BPM | BODY MASS INDEX: 29.8 KG/M2 | HEIGHT: 60 IN | SYSTOLIC BLOOD PRESSURE: 122 MMHG | DIASTOLIC BLOOD PRESSURE: 78 MMHG

## 2021-03-24 DIAGNOSIS — M65.4 TENOSYNOVITIS, DE QUERVAIN: Primary | ICD-10-CM

## 2021-03-24 PROCEDURE — 99213 OFFICE O/P EST LOW 20 MIN: CPT | Performed by: FAMILY MEDICINE

## 2021-03-24 PROCEDURE — G8419 CALC BMI OUT NRM PARAM NOF/U: HCPCS | Performed by: FAMILY MEDICINE

## 2021-03-24 PROCEDURE — 20550 NJX 1 TENDON SHEATH/LIGAMENT: CPT | Performed by: FAMILY MEDICINE

## 2021-03-24 PROCEDURE — G8427 DOCREV CUR MEDS BY ELIG CLIN: HCPCS | Performed by: FAMILY MEDICINE

## 2021-03-24 PROCEDURE — G8484 FLU IMMUNIZE NO ADMIN: HCPCS | Performed by: FAMILY MEDICINE

## 2021-03-24 PROCEDURE — 1036F TOBACCO NON-USER: CPT | Performed by: FAMILY MEDICINE

## 2021-03-24 PROCEDURE — 3017F COLORECTAL CA SCREEN DOC REV: CPT | Performed by: FAMILY MEDICINE

## 2021-03-24 RX ORDER — TRIAMCINOLONE ACETONIDE 40 MG/ML
40 INJECTION, SUSPENSION INTRA-ARTICULAR; INTRAMUSCULAR ONCE
Status: COMPLETED | OUTPATIENT
Start: 2021-03-24 | End: 2021-03-24

## 2021-03-24 RX ADMIN — TRIAMCINOLONE ACETONIDE 40 MG: 40 INJECTION, SUSPENSION INTRA-ARTICULAR; INTRAMUSCULAR at 10:15

## 2021-03-24 SDOH — ECONOMIC STABILITY: INCOME INSECURITY: HOW HARD IS IT FOR YOU TO PAY FOR THE VERY BASICS LIKE FOOD, HOUSING, MEDICAL CARE, AND HEATING?: NOT HARD AT ALL

## 2021-03-24 SDOH — ECONOMIC STABILITY: TRANSPORTATION INSECURITY
IN THE PAST 12 MONTHS, HAS THE LACK OF TRANSPORTATION KEPT YOU FROM MEDICAL APPOINTMENTS OR FROM GETTING MEDICATIONS?: NO

## 2021-03-24 SDOH — ECONOMIC STABILITY: FOOD INSECURITY: WITHIN THE PAST 12 MONTHS, YOU WORRIED THAT YOUR FOOD WOULD RUN OUT BEFORE YOU GOT MONEY TO BUY MORE.: NEVER TRUE

## 2021-03-24 SDOH — ECONOMIC STABILITY: FOOD INSECURITY: WITHIN THE PAST 12 MONTHS, THE FOOD YOU BOUGHT JUST DIDN'T LAST AND YOU DIDN'T HAVE MONEY TO GET MORE.: NEVER TRUE

## 2021-03-24 ASSESSMENT — PATIENT HEALTH QUESTIONNAIRE - PHQ9
SUM OF ALL RESPONSES TO PHQ9 QUESTIONS 1 & 2: 0
SUM OF ALL RESPONSES TO PHQ QUESTIONS 1-9: 0
2. FEELING DOWN, DEPRESSED OR HOPELESS: 0

## 2021-03-24 ASSESSMENT — ENCOUNTER SYMPTOMS
WHEEZING: 0
SHORTNESS OF BREATH: 0
ABDOMINAL PAIN: 0
CONSTIPATION: 0
SORE THROAT: 0
RHINORRHEA: 0
DIARRHEA: 0
COUGH: 0

## 2021-03-24 NOTE — PROGRESS NOTES
[address] [facility] [address] [place] [address] PRIMARY CARE  Brijesh Jimenes [address] [id number]  Dept: [phone]  Dept Fax: : [phone]     Chief Complaint  Chief Complaint   Patient presents with    Wrist Pain     right sided. SX since Saturday. HPI:  48 y. o.female who presents for the following:      R wrist pain: started after moving things in the bathroom 5 days ago (nothing strenuous); does much paperwork and computer use at work as a ; pain is not going away    Review of Systems   Constitutional: Negative for chills and fever. HENT: Negative for congestion, rhinorrhea and sore throat. Respiratory: Negative for cough, shortness of breath and wheezing. Gastrointestinal: Negative for abdominal pain, constipation and diarrhea. Endocrine: Negative for polydipsia and polyuria. Genitourinary: Negative for dysuria, frequency and urgency. Musculoskeletal: Positive for arthralgias. Neurological: Negative for syncope, light-headedness, numbness and headaches. Psychiatric/Behavioral: Negative for sleep disturbance. The patient is not nervous/anxious.         Past Medical History:   Diagnosis Date    Healthy adult on routine physical examination      Past Surgical History:   Procedure Laterality Date    APPENDECTOMY       Social History     Socioeconomic History    Marital status:      Spouse name: Not on file    Number of children: Not on file    Years of education: Not on file    Highest education level: Not on file   Occupational History    Not on file   Social Needs    Financial resource strain: Not hard at all   Riri-Randall insecurity     Worry: Never true     Inability: Never true   [employer] needs     Medical: No     Non-medical: No   Tobacco Use    Smoking status: Former Smoker     Quit date: 8/13/2010     Years since quitting: 10.6    Smokeless tobacco: Never Used   Substance and Sexual Activity    Alcohol use: Yes     Alcohol/week: 8.3 standard drinks     Types: 10 Standard drinks or equivalent per week    Drug use: No    Sexual activity: Yes     Partners: Male     Comment: foam   Lifestyle    Physical activity     Days per week: Not on file     Minutes per session: Not on file    Stress: Not on file   Relationships    Social connections     Talks on phone: Not on file     Gets together: Not on file     Attends Yazdanism service: Not on file     Active member of club or organization: Not on file     Attends meetings of clubs or organizations: Not on file     Relationship status: Not on file    Intimate partner violence     Fear of current or ex partner: Not on file     Emotionally abused: Not on file     Physically abused: Not on file     Forced sexual activity: Not on file   Other Topics Concern    Not on file   Social History Narrative    Not on file     Family History   Problem Relation Age of Onset    Other Mother         benign tumor on optic nerve    Cancer Father 64        lung    Heart Disease Maternal Grandmother     Other Maternal Grandfather         COPD    Other Paternal Grandmother         annurysm    Other Paternal Grandfather         alzheimers    Stroke Paternal Grandfather     Breast Cancer Neg Hx     Colon Cancer Neg Hx     Diabetes Neg Hx     Eclampsia Neg Hx     Hypertension Neg Hx     Ovarian Cancer Neg Hx      Labor Neg Hx     Spont Abortions Neg Hx       No Known Allergies  Current Outpatient Medications   Medication Sig Dispense Refill    Valerian 500 MG CAPS Take by mouth      Nutritional Supplements (ESTROVEN PO) Take by mouth      Multiple Vitamins-Minerals (MULTIVITAMIN PO) Take  by mouth.        Current Facility-Administered Medications   Medication Dose Route Frequency Provider Last Rate Last Admin    triamcinolone acetonide (KENALOG-40) injection 40 mg  40 mg Intra-articular Once Bang Montiel MD        triamcinolone acetonide (KENALOG-40) injection 40 mg  40 mg Intramuscular Once Calvin Solomon MD             Vitals:    03/24/21 1011   BP: 122/78   Site: Left Upper Arm   Position: Sitting   Cuff Size: Medium Adult   Pulse: 67   Temp: 97.2 °F (36.2 °C)   TempSrc: Infrared   SpO2: 98%   Weight: 151 lb 12.8 oz (68.9 kg)   Height: 5' (1.524 m)       Physical exam:  Physical Exam  Vitals signs reviewed. Constitutional:       General: She is not in acute distress. Appearance: She is well-developed. HENT:      Head: Normocephalic and atraumatic. Neck:      Musculoskeletal: Normal range of motion. Cardiovascular:      Rate and Rhythm: Normal rate. Pulmonary:      Effort: Pulmonary effort is normal. No respiratory distress. Musculoskeletal:        Hands:    Skin:     General: Skin is warm and dry. Neurological:      Mental Status: She is alert and oriented to person, place, and time. Psychiatric:         Behavior: Behavior normal.         Assessment/Plan:  48 y.o. female here mainly for R wrist pain:  - injection of site; discussed the role of repetitive stress in causing tendonitis; can ice or brace; if no improving then can consider sending to ortho    Procedure: R hand De Quervain's steroid injection  Risk and benefits discussed. Verbal consent obtained. Timeout performed. Area prepped in the usual fashion. Pain-Eaze sprayed over the site. Using 1/2in 27G needle, 1/2cc of 1% lidocaine + 1/2cc (40mg/1cc) kenalog injected over the EPB/APL tendons. Patient tolerated procedure without complication and counseled on post-procedure care (icing and brace). Diagnosis Orders   1. Tenosynovitis, de Quervain  [address]14 - MS INJECT TENDON SHEATH/LIGAMENT    triamcinolone acetonide (KENALOG-40) injection 40 mg        Return if symptoms worsen or fail to improve.     Calvin Solomon MD

## 2021-03-24 NOTE — PATIENT INSTRUCTIONS
Patient Education        Rosemary Hansen Tenosynovitis: Care Instructions  Your Care Instructions  Rosemary aHnsen (say \"Maddie\") tenosynovitis is a problem that makes the bottom of your thumb and the side of your wrist hurt. When you have de Quervain's, the ropey fiber (tendon) that helps move your thumb away from your fingers becomes swollen. You may have pain when you move your wrist or pick things up. You may hear a creaking sound when you move your wrist or thumb. Symptoms often get better in a few weeks with home care. Your doctor may want you to start some gentle stretching exercises once your symptoms are gone. Sometimes treatment with an injection or surgery is needed. Follow-up care is a key part of your treatment and safety. Be sure to make and go to all appointments, and call your doctor if you are having problems. It's also a good idea to know your test results and keep a list of the medicines you take. How can you care for yourself at home? · Until your symptoms are better, stop the activities that caused the pain. · Avoid moving the hand and wrist that hurt. · Follow your doctor's directions for wearing a splint to keep your thumb and wrist from moving. · Try ice or heat. ? Put ice or a cold pack on your thumb and wrist for 10 to 20 minutes at a time. Put a thin cloth between the ice and your skin. ? You can use heat for 20 to 30 minutes, 2 or 3 times a day. Try using a heating pad, hot shower, or hot pack. · Ask your doctor if you can take an over-the-counter pain medicine, such as acetaminophen (Tylenol), ibuprofen (Advil, Motrin), or naproxen (Aleve). Be safe with medicines. Read and follow all instructions on the label. When should you call for help?   Watch closely for changes in your health, and be sure to contact your doctor if:    · You have new or worse pain.     · You have new or worse numbness or tingling in your hand or fingers.     · Your hand feels weaker.     · You do not get better as expected. Where can you learn more? Go to [email or web address] and sign in to your [employer] account. Enter F437 in the [facility] box to learn more about \"De Quervain's Tenosynovitis: Care Instructions. \"     If you do not have an account, please click on the \"Sign Up Now\" link. Current as of: November 16, 2020               Content Version: 12.8  © 4159-8500 [employer]wise[employer] Incorporated. Care instructions adapted under license by [facility] ([facility]). If you have questions about a medical condition or this instruction, always ask your healthcare professional. [address] any warranty or liability for your use of this information.

## 2021-06-14 ENCOUNTER — OFFICE VISIT (OUTPATIENT)
Dept: FAMILY MEDICINE CLINIC | Age: 51
End: 2021-06-14
Payer: COMMERCIAL

## 2021-06-14 VITALS
WEIGHT: 151 LBS | BODY MASS INDEX: 29.49 KG/M2 | HEART RATE: 73 BPM | TEMPERATURE: 98 F | SYSTOLIC BLOOD PRESSURE: 116 MMHG | OXYGEN SATURATION: 98 % | DIASTOLIC BLOOD PRESSURE: 72 MMHG

## 2021-06-14 DIAGNOSIS — M79.641 RIGHT HAND PAIN: Primary | ICD-10-CM

## 2021-06-14 PROCEDURE — G8419 CALC BMI OUT NRM PARAM NOF/U: HCPCS | Performed by: FAMILY MEDICINE

## 2021-06-14 PROCEDURE — 99213 OFFICE O/P EST LOW 20 MIN: CPT | Performed by: FAMILY MEDICINE

## 2021-06-14 PROCEDURE — 3017F COLORECTAL CA SCREEN DOC REV: CPT | Performed by: FAMILY MEDICINE

## 2021-06-14 PROCEDURE — G8427 DOCREV CUR MEDS BY ELIG CLIN: HCPCS | Performed by: FAMILY MEDICINE

## 2021-06-14 PROCEDURE — 1036F TOBACCO NON-USER: CPT | Performed by: FAMILY MEDICINE

## 2021-06-14 ASSESSMENT — ENCOUNTER SYMPTOMS
ABDOMINAL PAIN: 0
DIARRHEA: 0
WHEEZING: 0
SORE THROAT: 0
SHORTNESS OF BREATH: 0
RHINORRHEA: 0
CONSTIPATION: 0
COUGH: 0

## 2021-06-14 NOTE — PROGRESS NOTES
[address] [facility] [address] [facility] PRIMARY CARE  Brijesh Jimenes 51 [address]  Dept: [phone]  Dept Fax: : [phone]     Chief Complaint  Chief Complaint   Patient presents with    Hand Pain     x 1 month, right, had a bruised        HPI:  48 y. 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 HENT: Negative for congestion, rhinorrhea and sore throat. Respiratory: Negative for cough, shortness of breath and wheezing. Gastrointestinal: Negative for abdominal pain, constipation and diarrhea. Endocrine: Negative for polydipsia and polyuria. Genitourinary: Negative for dysuria, frequency and urgency. Musculoskeletal: Positive for arthralgias. Neurological: Negative for syncope, light-headedness, numbness and headaches. Psychiatric/Behavioral: Negative for sleep disturbance. The patient is not nervous/anxious. Past Medical History:   Diagnosis Date    Healthy adult on routine physical examination      Past Surgical History:   Procedure Laterality Date    APPENDECTOMY       Social History     Socioeconomic History    Marital status:      Spouse name: Not on file    Number of children: Not on file    Years of education: Not on file    Highest education level: Not on file   Occupational History    Not on file   Tobacco Use    Smoking status: Former Smoker     Quit date: 8/13/2010     Years since quitting: 10.8    Smokeless tobacco: Never Used   Substance and Sexual Activity    Alcohol use:  Yes     Alcohol/week: 8.3 standard drinks     Types: 10 Standard drinks or equivalent per week    Drug use: No    Sexual activity: Yes     Partners: Male     Comment: foam   Other Topics Concern    Not on file   Social History Narrative    Not on file     Social Determinants of Health     Financial Resource Strain: Low Risk     Difficulty of Paying Living Expenses: Not hard at all   Food Insecurity: No Food Insecurity    Worried About [address] in the Last Year: Never true    [facility] [place] in the Last Year: Never true   Transportation Needs: No Transportation Needs    Lack of Transportation (Medical): No    Lack of Transportation (Non-Medical): No   Physical Activity:     Days of Exercise per Week:     Minutes of Exercise per Session:    Stress:     Feeling of Stress :    Social Connections:     Frequency of Communication with Friends and Family:     Frequency of Social Gatherings with Friends and Family:     Attends Caodaism Services:     Active Member of Clubs or Organizations:     Attends Club or Organization Meetings:     Marital Status:    Intimate Partner Violence:     Fear of Current or Ex-Partner:     Emotionally Abused:     Physically Abused:     Sexually Abused:      Family History   Problem Relation Age of Onset    Other Mother         benign tumor on optic nerve    Cancer Father 64        lung    Heart Disease Maternal Grandmother     Other Maternal Grandfather         COPD    Other Paternal Grandmother         annurysm    Other Paternal Grandfather         alzheimers    Stroke Paternal Grandfather     Breast Cancer Neg Hx     Colon Cancer Neg Hx     Diabetes Neg Hx     Eclampsia Neg Hx     Hypertension Neg Hx     Ovarian Cancer Neg Hx      Labor Neg Hx     Spont Abortions Neg Hx       No Known Allergies  Current Outpatient Medications   Medication Sig Dispense Refill    Multiple Vitamins-Minerals (MULTIVITAMIN PO) Take  by mouth.       Valerian 500 MG CAPS Take by mouth       Current Facility-Administered Medications   Medication Dose Route Frequency Provider Last Rate Last Admin    triamcinolone acetonide (KENALOG-40) injection 40 mg  40 mg Intramuscular Once Penny Mosher MD             Vitals:    06/14/21 0811   BP: 116/72   Site: Right Upper Arm   Position: Sitting   Cuff Size: Medium Adult   Pulse: 73   Temp: 98 °F (36.7 °C)   TempSrc: Infrared   SpO2: 98%   Weight: 151 lb (68.5 kg)       Physical exam:  Physical Exam  Vitals reviewed. Constitutional:       General: She is not in acute distress. Appearance: She is well-developed. HENT:      Head: Normocephalic and atraumatic. Cardiovascular:      Rate and Rhythm: Normal rate. Pulmonary:      Effort: Pulmonary effort is normal. No respiratory distress. Musculoskeletal:      Right hand: Bony tenderness present. Hands:       Cervical back: Normal range of motion. Skin:     General: Skin is warm and dry. Neurological:      Mental Status: She is alert and oriented to person, place, and time. Psychiatric:         Behavior: Behavior normal.         Assessment/Plan:  48 y.o. female here mainly for R hand pain:  - R hand pain: possible occult fx; checking xray and sending to hand clinic     Diagnosis Orders   1. Right hand pain  XR HAND RIGHT (MIN 3 VIEWS)    Ambulatory referral to Orthopedic Surgery        Return if symptoms worsen or fail to improve.     Penny Mosher MD

## 2021-06-16 ENCOUNTER — HOSPITAL ENCOUNTER (OUTPATIENT)
Age: 51
Discharge: HOME | End: 2021-06-18
Payer: COMMERCIAL

## 2021-06-16 ENCOUNTER — HOSPITAL ENCOUNTER (OUTPATIENT)
Dept: GENERAL RADIOLOGY | Age: 51
Discharge: HOME | End: 2021-06-18
Payer: COMMERCIAL

## 2021-06-16 DIAGNOSIS — M79.641 RIGHT HAND PAIN: ICD-10-CM

## 2021-06-16 PROCEDURE — 73130 X-RAY EXAM OF HAND: CPT

## 2021-07-12 ENCOUNTER — HOSPITAL ENCOUNTER (OUTPATIENT)
Dept: ORTHOPEDIC SURGERY | Age: 51
Discharge: HOME | End: 2021-07-14
Payer: COMMERCIAL

## 2021-07-12 ENCOUNTER — OFFICE VISIT (OUTPATIENT)
Dept: ORTHOPEDIC SURGERY | Age: 51
End: 2021-07-12
Payer: COMMERCIAL

## 2021-07-12 VITALS
TEMPERATURE: 98 F | RESPIRATION RATE: 12 BRPM | HEART RATE: 80 BPM | DIASTOLIC BLOOD PRESSURE: 85 MMHG | OXYGEN SATURATION: 100 % | SYSTOLIC BLOOD PRESSURE: 120 MMHG

## 2021-07-12 DIAGNOSIS — S62.346A NONDISPLACED FRACTURE OF BASE OF FIFTH METACARPAL BONE, RIGHT HAND, INITIAL ENCOUNTER FOR CLOSED FRACTURE: Primary | ICD-10-CM

## 2021-07-12 DIAGNOSIS — S62.346A NONDISPLACED FRACTURE OF BASE OF FIFTH METACARPAL BONE, RIGHT HAND, INITIAL ENCOUNTER FOR CLOSED FRACTURE: ICD-10-CM

## 2021-07-12 PROCEDURE — 73130 X-RAY EXAM OF HAND: CPT

## 2021-07-12 PROCEDURE — 3017F COLORECTAL CA SCREEN DOC REV: CPT | Performed by: ORTHOPAEDIC SURGERY

## 2021-07-12 PROCEDURE — 73130 X-RAY EXAM OF HAND: CPT | Performed by: ORTHOPAEDIC SURGERY

## 2021-07-12 PROCEDURE — G8419 CALC BMI OUT NRM PARAM NOF/U: HCPCS | Performed by: ORTHOPAEDIC SURGERY

## 2021-07-12 PROCEDURE — 1036F TOBACCO NON-USER: CPT | Performed by: ORTHOPAEDIC SURGERY

## 2021-07-12 PROCEDURE — G8427 DOCREV CUR MEDS BY ELIG CLIN: HCPCS | Performed by: ORTHOPAEDIC SURGERY

## 2021-07-12 PROCEDURE — 99203 OFFICE O/P NEW LOW 30 MIN: CPT | Performed by: ORTHOPAEDIC SURGERY

## 2021-07-12 NOTE — PROGRESS NOTES
Subjective:      Patient ID: Xander Salazar is a 48 y.o. 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 She noticed several days later bruising. She ever since then has been having pain, although the pain is better. She went to the doctor on 6/16/2021 where x-ray shows a fracture of the base of the fifth metacarpal.    Over the last year she has had pain and arthritic symptoms of the wrist, and has been getting yearly cortisone shots. She has had 4 so far in the carpus. Past Medical History:   Diagnosis Date    Healthy adult on routine physical examination      Past Surgical History:   Procedure Laterality Date    APPENDECTOMY       Social History     Socioeconomic History    Marital status:      Spouse name: Not on file    Number of children: Not on file    Years of education: Not on file    Highest education level: Not on file   Occupational History    Not on file   Tobacco Use    Smoking status: Former Smoker     Quit date: 8/13/2010     Years since quitting: 10.9    Smokeless tobacco: Never Used   Substance and Sexual Activity    Alcohol use:  Yes     Alcohol/week: 8.3 standard drinks     Types: 10 Standard drinks or equivalent per week    Drug use: No    Sexual activity: Yes     Partners: Male     Comment: foam   Other Topics Concern    Not on file   Social History Narrative    Not on file     Social Determinants of Health     Financial Resource Strain: Low Risk     Difficulty of Paying Living Expenses: Not hard at all   Food Insecurity: No Food Insecurity    Worried About Running Out of Food in the Last Year: Never true    Ailyn of Food in the Last Year: Never true   Transportation Needs: No Transportation Needs  Lack of Transportation (Medical): No    Lack of Transportation (Non-Medical): No   Physical Activity:     Days of Exercise per Week:     Minutes of Exercise per Session:    Stress:     Feeling of Stress :    Social Connections:     Frequency of Communication with Friends and Family:     Frequency of Social Gatherings with Friends and Family:     Attends Anabaptism Services:     Active Member of Clubs or Organizations:     Attends Club or Organization Meetings:     Marital Status:    Intimate Partner Violence:     Fear of Current or Ex-Partner:     Emotionally Abused:     Physically Abused:     Sexually Abused:      Family History   Problem Relation Age of Onset    Other Mother         benign tumor on optic nerve    Cancer Father 64        lung    Heart Disease Maternal Grandmother     Other Maternal Grandfather         COPD    Other Paternal Grandmother         annurysm    Other Paternal Grandfather         alzheimers    Stroke Paternal Grandfather     Breast Cancer Neg Hx     Colon Cancer Neg Hx     Diabetes Neg Hx     Eclampsia Neg Hx     Hypertension Neg Hx     Ovarian Cancer Neg Hx      Labor Neg Hx     Spont Abortions Neg Hx      No Known Allergies  Current Outpatient Medications on File Prior to Visit   Medication Sig Dispense Refill    Valerian 500 MG CAPS Take by mouth      Multiple Vitamins-Minerals (MULTIVITAMIN PO) Take  by mouth. Current Facility-Administered Medications on File Prior to Visit   Medication Dose Route Frequency Provider Last Rate Last Admin    triamcinolone acetonide (KENALOG-40) injection 40 mg  40 mg Intramuscular Once Scarlett Ross MD            Acute and Chronic Pain Monitoring:   No flowsheet data found. Review of Systems  No history of fevers or chills    Objective--Physical Examination:     /85   Pulse 80   Temp 98 °F (36.7 °C)   Resp 12   SpO2 100%     Physical Examination:   Ortho Exam  Ms. Ekta Ovalle is a 59-year-old woman, not apparent distress. The right hand has a normal appearance, there is early Heberden nodes at the DIP joints. There is still fair amount of tenderness at the base, with more tenderness over the neck of the fifth metacarpal.  There is occasionally soreness in the anatomic snuffbox. Is very minimally tender there. There is full range of motion of the wrist.  She can make a full fist easily. She is neurovascular intact. Radiographs and Laboratory Studies:     Diagnostic Imaging Studies:    AP lateral and scaphoid view taken on 6/16/2021 reveal the fracture of the base of the fifth metacarpal, with a large cyst in the scaphoid. There is very minimal narrowing of the IP joint of the thumb. AP lateral scaphoid view taken on 7/12/2021 revealed that the fracture base of the fifth metacarpal is healing well. There is no malalignment. No abnormalities are noted in the neck of the fifth metacarpal.  There is no change in the scaphoid cyst.    Laboratory Studies:   Lab Results   Component Value Date    WBC 3.8 (L) 11/23/2016    HGB 14.0 11/23/2016    HCT 42.8 11/23/2016    MCV 92.7 11/23/2016     11/23/2016     No results found for: SEDRATE  No results found for: CRP      Procedures Performed Today:     [1]    Assessment / Plan:      Diagnosis Orders   1. Nondisplaced fracture of base of fifth metacarpal bone, right hand, initial encounter for closed fracture  XR HAND RIGHT (MIN 3 VIEWS)      Orders Placed This Encounter   Procedures    XR HAND RIGHT (MIN 3 VIEWS)     Standing Status:   Future     Number of Occurrences:   1     Standing Expiration Date:   7/12/2022     No orders of the defined types were placed in this encounter. Patient Instructions   We discussed with her that the fracture is healing well, and that she can expect it to be stills in decreasing fashion sore for another month. She may be active as tolerated.   If she still has pain in another 6 weeks recommend to see her back otherwise will follow on a as needed basis. We discussed that she probably is at risk of fracturing the scaphoid which she follows on a. Return if symptoms worsen or fail to improve.     Zachariah Duran M.D., [address] [facility]  Orthopaedic Surgery

## 2021-07-12 NOTE — PATIENT INSTRUCTIONS
We discussed with her that the fracture is healing well, and that she can expect it to be stills in decreasing fashion sore for another month. She may be active as tolerated. If she still has pain in another 6 weeks recommend to see her back otherwise will follow on a as needed basis. We discussed that she probably is at risk of fracturing the scaphoid which she follows on a.

## 2021-09-13 ENCOUNTER — OFFICE VISIT (OUTPATIENT)
Dept: FAMILY MEDICINE CLINIC | Age: 51
End: 2021-09-13
Payer: COMMERCIAL

## 2021-09-13 VITALS
TEMPERATURE: 97.4 F | OXYGEN SATURATION: 95 % | DIASTOLIC BLOOD PRESSURE: 82 MMHG | BODY MASS INDEX: 29.76 KG/M2 | HEIGHT: 60 IN | WEIGHT: 151.6 LBS | SYSTOLIC BLOOD PRESSURE: 124 MMHG | HEART RATE: 70 BPM

## 2021-09-13 DIAGNOSIS — N95.1 PERIMENOPAUSAL: Primary | ICD-10-CM

## 2021-09-13 PROCEDURE — G8419 CALC BMI OUT NRM PARAM NOF/U: HCPCS | Performed by: FAMILY MEDICINE

## 2021-09-13 PROCEDURE — 3017F COLORECTAL CA SCREEN DOC REV: CPT | Performed by: FAMILY MEDICINE

## 2021-09-13 PROCEDURE — G8427 DOCREV CUR MEDS BY ELIG CLIN: HCPCS | Performed by: FAMILY MEDICINE

## 2021-09-13 PROCEDURE — 1036F TOBACCO NON-USER: CPT | Performed by: FAMILY MEDICINE

## 2021-09-13 PROCEDURE — 99213 OFFICE O/P EST LOW 20 MIN: CPT | Performed by: FAMILY MEDICINE

## 2021-09-13 RX ORDER — ESCITALOPRAM OXALATE 10 MG/1
10 TABLET ORAL DAILY
Qty: 30 TABLET | Refills: 5 | Status: SHIPPED | OUTPATIENT
Start: 2021-09-13 | End: 2022-03-14

## 2021-09-13 ASSESSMENT — ENCOUNTER SYMPTOMS
SORE THROAT: 0
RHINORRHEA: 0
SHORTNESS OF BREATH: 0
WHEEZING: 0
DIARRHEA: 0
ABDOMINAL PAIN: 0
CONSTIPATION: 0
COUGH: 0

## 2021-09-13 NOTE — PROGRESS NOTES
[address] [facility] [address] [facility] PRIMARY CARE  [address] [place] [id number]  Dept: [phone]  Dept Fax: : [phone]     Chief Complaint  Chief Complaint   Patient presents with    Menopause     Hot flash symptoms. SX for quite a while. No period in 4 months.  Insomnia    Fussy       HPI:  46 y. o.female who presents for the following:      Hot flashes: thinks she is getting bad perimenopausal symptoms, hot flashes, irritable, trouble sleeping. LMP 4mo ago and irregular. The hot flashes are the worst part and happen all day with much sweating at times; tried otc estrovem, black cohosh, valerian root; quit smoking 7 years ago. Review of Systems   Constitutional: Positive for diaphoresis. Negative for chills and fever. HENT: Negative for congestion, rhinorrhea and sore throat. Respiratory: Negative for cough, shortness of breath and wheezing. Gastrointestinal: Negative for abdominal pain, constipation and diarrhea. Endocrine: Negative for polydipsia and polyuria. Genitourinary: Negative for dysuria, frequency and urgency. Neurological: Negative for syncope, light-headedness, numbness and headaches. Psychiatric/Behavioral: Negative for sleep disturbance. The patient is not nervous/anxious.         Past Medical History:   Diagnosis Date    Healthy adult on routine physical examination      Past Surgical History:   Procedure Laterality Date    APPENDECTOMY       Social History     Socioeconomic History    Marital status:      Spouse name: Not on file    Number of children: Not on file    Years of education: Not on file    Highest education level: Not on file   Occupational History    Not on file   Tobacco Use    Smoking status: Former Smoker     Packs/day: 0.00     Years: 0.00     Pack years: 0.00     Quit date: 2010     Years since quittin.0    Smokeless tobacco: Never Used Substance and Sexual Activity    Alcohol use: Yes     Alcohol/week: 8.3 standard drinks     Types: 10 Standard drinks or equivalent per week    Drug use: No    Sexual activity: Yes     Partners: Male     Comment: foam   Other Topics Concern    Not on file   Social History Narrative    Not on file     Social Determinants of Health     Financial Resource Strain: Low Risk     Difficulty of Paying Living Expenses: Not hard at all   Food Insecurity: No Food Insecurity    Worried About [address] in the Last Year: Never true    [address] [facility] [address] in the Last Year: Never true   Transportation Needs: No Transportation Needs    Lack of Transportation (Medical): No    Lack of Transportation (Non-Medical):  No   Physical Activity:     Days of Exercise per Week:     Minutes of Exercise per Session:    Stress:     Feeling of Stress :    Social Connections:     Frequency of Communication with Friends and Family:     Frequency of Social Gatherings with Friends and Family:     Attends [employer] Services:     Active Member of Clubs or Organizations:     Attends Club or Organization Meetings:     Marital Status:    Intimate Partner Violence:     Fear of Current or Ex-Partner:     Emotionally Abused:     Physically Abused:     Sexually Abused:      Family History   Problem Relation Age of Onset    Other Mother         benign tumor on optic nerve    Cancer Father 64        lung    Heart Disease Maternal Grandmother     Other Maternal Grandfather         COPD    Other Paternal Grandmother         annurysm    Other Paternal Grandfather         alzheimers    Stroke Paternal Grandfather     Breast Cancer Neg Hx     Colon Cancer Neg Hx     Diabetes Neg Hx     Eclampsia Neg Hx     Hypertension Neg Hx     Ovarian Cancer Neg Hx      Labor Neg Hx     Spont Abortions Neg Hx       No Known Allergies  Current Outpatient Medications   Medication Sig Dispense Refill    escitalopram (LEXAPRO) 10 MG tablet Take 1 tablet by mouth daily 30 tablet 5    Valerian 500 MG CAPS Take by mouth      Multiple Vitamins-Minerals (MULTIVITAMIN PO) Take  by mouth. Current Facility-Administered Medications   Medication Dose Route Frequency Provider Last Rate Last Admin    triamcinolone acetonide (KENALOG-40) injection 40 mg  40 mg IntraMUSCular Once Flakito Wisdom MD             Vitals:    09/13/21 0804   BP: 124/82   Site: Left Upper Arm   Position: Sitting   Cuff Size: Medium Adult   Pulse: 70   Temp: 97.4 °F (36.3 °C)   TempSrc: Infrared   SpO2: 95%   Weight: 151 lb 9.6 oz (68.8 kg)   Height: 5' (1.524 m)       Physical exam:  Physical Exam  Vitals reviewed. Constitutional:       General: She is not in acute distress. Appearance: She is well-developed. HENT:      Head: Normocephalic and atraumatic. Cardiovascular:      Rate and Rhythm: Normal rate. Pulmonary:      Effort: Pulmonary effort is normal. No respiratory distress. Musculoskeletal:      Cervical back: Normal range of motion. Skin:     General: Skin is warm and dry. Neurological:      Mental Status: She is alert and oriented to person, place, and time. Psychiatric:         Behavior: Behavior normal.         Assessment/Plan:  46 y.o. female here mainly for perimenopausal symptoms:  - discussed herbals, soy products, SSRIs, and estrogen replacement; will start with lexapro and soy on her own. She is looking forward to winter; can consider checking thyroid function with her next set of annual labs     Diagnosis Orders   1. Perimenopausal  escitalopram (LEXAPRO) 10 MG tablet        Return if symptoms worsen or fail to improve.     Flakito Wisdom MD

## 2022-03-13 DIAGNOSIS — N95.1 PERIMENOPAUSAL: ICD-10-CM

## 2022-03-14 RX ORDER — ESCITALOPRAM OXALATE 10 MG/1
10 TABLET ORAL DAILY
Qty: 30 TABLET | Refills: 5 | Status: SHIPPED | OUTPATIENT
Start: 2022-03-14 | End: 2022-06-01 | Stop reason: SDUPTHER

## 2022-03-14 NOTE — TELEPHONE ENCOUNTER
Comments:     Last Office Visit (last PCP visit):   9/13/2021    Next Visit Date:  No future appointments. **If hasn't been seen in over a year OR hasn't followed up according to last diabetes/ADHD visit, make appointment for patient before sending refill to provider.     Rx requested:  Requested Prescriptions     Pending Prescriptions Disp Refills    escitalopram (LEXAPRO) 10 MG tablet [Pharmacy Med Name: escitalopram 10 mg tablet] 30 tablet 5     Sig: Take 1 tablet by mouth daily

## 2022-05-18 ENCOUNTER — OFFICE VISIT (OUTPATIENT)
Dept: OBGYN CLINIC | Age: 52
End: 2022-05-18
Payer: COMMERCIAL

## 2022-05-18 VITALS
WEIGHT: 154 LBS | DIASTOLIC BLOOD PRESSURE: 82 MMHG | HEIGHT: 61 IN | BODY MASS INDEX: 29.07 KG/M2 | SYSTOLIC BLOOD PRESSURE: 130 MMHG

## 2022-05-18 DIAGNOSIS — Z01.419 WOMEN'S ANNUAL ROUTINE GYNECOLOGICAL EXAMINATION: Primary | ICD-10-CM

## 2022-05-18 DIAGNOSIS — Z12.31 SCREENING MAMMOGRAM FOR BREAST CANCER: ICD-10-CM

## 2022-05-18 DIAGNOSIS — Z11.51 SCREENING FOR HUMAN PAPILLOMAVIRUS: ICD-10-CM

## 2022-05-18 PROCEDURE — 99386 PREV VISIT NEW AGE 40-64: CPT | Performed by: OBSTETRICS & GYNECOLOGY

## 2022-05-18 ASSESSMENT — PATIENT HEALTH QUESTIONNAIRE - PHQ9
SUM OF ALL RESPONSES TO PHQ9 QUESTIONS 1 & 2: 0
2. FEELING DOWN, DEPRESSED OR HOPELESS: 0
SUM OF ALL RESPONSES TO PHQ QUESTIONS 1-9: 0
1. LITTLE INTEREST OR PLEASURE IN DOING THINGS: 0
SUM OF ALL RESPONSES TO PHQ QUESTIONS 1-9: 0

## 2022-05-18 ASSESSMENT — ENCOUNTER SYMPTOMS
ABDOMINAL DISTENTION: 0
CONSTIPATION: 0
NAUSEA: 0
DIARRHEA: 0
ALLERGIC/IMMUNOLOGIC NEGATIVE: 1
ANAL BLEEDING: 0
VOMITING: 0
BLOOD IN STOOL: 0
RESPIRATORY NEGATIVE: 1
ABDOMINAL PAIN: 0
RECTAL PAIN: 0
EYES NEGATIVE: 1

## 2022-05-18 ASSESSMENT — VISUAL ACUITY: OU: 1

## 2022-05-18 NOTE — PROGRESS NOTES
Subjective:      Patient ID: Abram Easley is a 46 y.o. female    Annual exam. New patient; reviewed medical, surgical, social and family history. Also reviewed current medications and allergies. No PMB. No GYN complaints. Pap performed and screening mammogram ordered. Monthly SBE encouraged. Screening colonoscopy recommended per routine. F/U annual exam or prn. Vitals:  /82   Ht 5' 1\" (1.549 m)   Wt 154 lb (69.9 kg)   LMP 06/15/2021   BMI 29.10 kg/m²   Past Medical History:   Diagnosis Date    Healthy adult on routine physical examination     Hot flashes      Past Surgical History:   Procedure Laterality Date    APPENDECTOMY       Allergies:  Patient has no known allergies. Current Outpatient Medications   Medication Sig Dispense Refill    escitalopram (LEXAPRO) 10 MG tablet Take 1 tablet by mouth daily 30 tablet 5    Valerian 500 MG CAPS Take by mouth      Multiple Vitamins-Minerals (MULTIVITAMIN PO) Take  by mouth. Current Facility-Administered Medications   Medication Dose Route Frequency Provider Last Rate Last Admin    triamcinolone acetonide (KENALOG-40) injection 40 mg  40 mg IntraMUSCular Once Andrade Quiroga MD         Social History     Socioeconomic History    Marital status:      Spouse name: Not on file    Number of children: Not on file    Years of education: Not on file    Highest education level: Not on file   Occupational History    Not on file   Tobacco Use    Smoking status: Former Smoker     Packs/day: 0.00     Years: 0.00     Pack years: 0.00     Quit date: 2010     Years since quittin.7    Smokeless tobacco: Never Used   Substance and Sexual Activity    Alcohol use:  Yes     Alcohol/week: 8.3 standard drinks     Types: 10 Standard drinks or equivalent per week     Comment: occ    Drug use: No    Sexual activity: Yes     Partners: Male     Birth control/protection: Condom   Other Topics Concern    Not on file   Social History Narrative  Not on file     Social Determinants of Health     Financial Resource Strain:     Difficulty of Paying Living Expenses: Not on file   Food Insecurity:     Worried About Running Out of Food in the Last Year: Not on file    Ailyn of Food in the Last Year: Not on file   Transportation Needs:     Lack of Transportation (Medical): Not on file    Lack of Transportation (Non-Medical):  Not on file   Physical Activity:     Days of Exercise per Week: Not on file    Minutes of Exercise per Session: Not on file   Stress:     Feeling of Stress : Not on file   Social Connections:     Frequency of Communication with Friends and Family: Not on file    Frequency of Social Gatherings with Friends and Family: Not on file    Attends Latter day Services: Not on file    Active Member of Clubs or Organizations: Not on file    Attends Club or Organization Meetings: Not on file    Marital Status: Not on file   Intimate Partner Violence:     Fear of Current or Ex-Partner: Not on file    Emotionally Abused: Not on file    Physically Abused: Not on file    Sexually Abused: Not on file   Housing Stability:     Unable to Pay for Housing in the Last Year: Not on file    Number of Places Lived in the Last Year: Not on file    Unstable Housing in the Last Year: Not on file     Family History   Problem Relation Age of Onset    Other Mother         benign tumor on optic nerve    Cancer Father 64        lung    Heart Disease Maternal Grandmother     Other Maternal Grandfather         COPD    Other Paternal Grandmother         annurysm    Other Paternal Grandfather         alzheimers    Stroke Paternal Grandfather     Breast Cancer Neg Hx     Colon Cancer Neg Hx     Diabetes Neg Hx     Eclampsia Neg Hx     Hypertension Neg Hx     Ovarian Cancer Neg Hx      Labor Neg Hx     Spont Abortions Neg Hx     Bleeding Prob Neg Hx     Asthma Neg Hx     Deep Vein Thrombosis Neg Hx     Congenital Anomalies Neg Hx  Heart Surgery Neg Hx     Hemophilia Neg Hx     Mental Illness Neg Hx     Migraines Neg Hx     Intellectual Disability Neg Hx     Preeclampsia Neg Hx     Uterine Cancer Neg Hx        Review of Systems   Constitutional: Negative. Negative for activity change, appetite change, chills, diaphoresis, fatigue, fever and unexpected weight change. HENT: Negative. Eyes: Negative. Respiratory: Negative. Cardiovascular: Negative. Gastrointestinal: Negative for abdominal distention, abdominal pain, anal bleeding, blood in stool, constipation, diarrhea, nausea, rectal pain and vomiting. Endocrine: Negative. Genitourinary: Negative for decreased urine volume, difficulty urinating, dyspareunia, dysuria, enuresis, flank pain, frequency, genital sores, hematuria, menstrual problem, pelvic pain, urgency, vaginal bleeding, vaginal discharge and vaginal pain. Musculoskeletal: Negative. Skin: Negative. Allergic/Immunologic: Negative. Neurological: Negative. Hematological: Negative. Psychiatric/Behavioral: Negative. Objective:     Physical Exam  Constitutional:       Appearance: She is well-developed. HENT:      Head: Normocephalic. Eyes:      General: Lids are normal. Vision grossly intact. Neck:      Thyroid: No thyromegaly. Cardiovascular:      Rate and Rhythm: Normal rate and regular rhythm. Heart sounds: Normal heart sounds. Pulmonary:      Effort: Pulmonary effort is normal. No respiratory distress. Breath sounds: Normal breath sounds. No wheezing or rales. Chest:      Chest wall: No tenderness. Breasts:      Right: Normal. No swelling, bleeding, inverted nipple, mass, nipple discharge, skin change, tenderness, axillary adenopathy or supraclavicular adenopathy. Left: Normal. No swelling, bleeding, inverted nipple, mass, nipple discharge, skin change, tenderness, axillary adenopathy or supraclavicular adenopathy.        Abdominal:      General: There is no distension. Palpations: Abdomen is soft. There is no mass. Tenderness: There is no abdominal tenderness. There is no guarding or rebound. Hernia: No hernia is present. There is no hernia in the left inguinal area or right inguinal area. Genitourinary:     General: Normal vulva. Pubic Area: No rash. Labia:         Right: No rash, tenderness, lesion or injury. Left: No rash, tenderness, lesion or injury. Urethra: No prolapse, urethral swelling or urethral lesion. Vagina: Normal. No signs of injury and foreign body. No vaginal discharge, erythema, tenderness or bleeding. Cervix: No cervical motion tenderness, discharge or friability. Uterus: Not deviated, not enlarged, not fixed and not tender. Adnexa:         Right: No mass, tenderness or fullness. Left: No mass, tenderness or fullness. Rectum: Normal.   Musculoskeletal:         General: No tenderness. Normal range of motion. Cervical back: Normal range of motion and neck supple. Lymphadenopathy:      Cervical: No cervical adenopathy. Upper Body:      Right upper body: No supraclavicular or axillary adenopathy. Left upper body: No supraclavicular or axillary adenopathy. Lower Body: No right inguinal adenopathy. No left inguinal adenopathy. Skin:     General: Skin is warm and dry. Coloration: Skin is not pale. Findings: No erythema or rash. Neurological:      Mental Status: She is alert and oriented to person, place, and time. Psychiatric:         Behavior: Behavior normal.         Thought Content: Thought content normal.         Judgment: Judgment normal.         Assessment:       Diagnosis Orders   1. Women's annual routine gynecological examination  PAP SMEAR   2. Screening for human papillomavirus  PAP SMEAR   3.  Screening mammogram for breast cancer  OTILIO DIGITAL SCREEN W OR WO CAD BILATERAL        Plan:      Medications placedthis encounter:  No orders of the defined types were placed in this encounter. Orders placedthis encounter:  Orders Placed This Encounter   Procedures    OTILIO DIGITAL SCREEN W OR WO CAD BILATERAL     Standing Status:   Future     Standing Expiration Date:   5/18/2023    PAP SMEAR     Standing Status:   Future     Standing Expiration Date:   5/18/2023     Order Specific Question:   Collection Type     Answer: Thin Prep     Order Specific Question:   Prior Abnormal Pap Test     Answer:   No     Order Specific Question:   Screening or Diagnostic     Answer:   Screening     Order Specific Question:   HPV Requested? Answer:   Yes     Order Specific Question:   High Risk Patient     Answer:   N/A         Follow up:  Return in about 1 year (around 5/18/2023) for Annual.   Repeat Annual GYN exam every 1 year. Cervical Cytology exam starts age 24. If Negative Cytology;  follow-up screening per current guidelines. Mammograms yearly starting at age 36. Calcium and Vitamin D dosing reviewed ( age appropriate ). Colonoscopy and bone density screening discussed ( age appropriate ). Birth control and STD prevention discussed ( age appropriate ). Gardisil counseling completed for all patients ( age appropriate ). Routine health maintenance ( per PCP and guidelines ). The patient was asked if she would like a chaperone present for her intimate exam. She  Declines the chaperone.  Rohan Zuñiga MD

## 2022-05-26 DIAGNOSIS — Z01.419 WOMEN'S ANNUAL ROUTINE GYNECOLOGICAL EXAMINATION: ICD-10-CM

## 2022-05-26 DIAGNOSIS — Z11.51 SCREENING FOR HUMAN PAPILLOMAVIRUS: ICD-10-CM

## 2022-06-01 ENCOUNTER — OFFICE VISIT (OUTPATIENT)
Dept: INTERNAL MEDICINE | Age: 52
End: 2022-06-01
Payer: COMMERCIAL

## 2022-06-01 VITALS
HEART RATE: 63 BPM | OXYGEN SATURATION: 98 % | BODY MASS INDEX: 28.52 KG/M2 | DIASTOLIC BLOOD PRESSURE: 82 MMHG | HEIGHT: 62 IN | WEIGHT: 155 LBS | SYSTOLIC BLOOD PRESSURE: 130 MMHG

## 2022-06-01 DIAGNOSIS — Z11.59 ENCOUNTER FOR HEPATITIS C SCREENING TEST FOR LOW RISK PATIENT: ICD-10-CM

## 2022-06-01 DIAGNOSIS — Z00.00 ANNUAL PHYSICAL EXAM: Primary | ICD-10-CM

## 2022-06-01 DIAGNOSIS — Z00.00 ANNUAL PHYSICAL EXAM: ICD-10-CM

## 2022-06-01 DIAGNOSIS — N95.1 PERIMENOPAUSAL: ICD-10-CM

## 2022-06-01 LAB
ALBUMIN SERPL-MCNC: 4.5 G/DL (ref 3.5–4.6)
ALP BLD-CCNC: 78 U/L (ref 40–130)
ALT SERPL-CCNC: 15 U/L (ref 0–33)
ANION GAP SERPL CALCULATED.3IONS-SCNC: 17 MEQ/L (ref 9–15)
AST SERPL-CCNC: 23 U/L (ref 0–35)
BILIRUB SERPL-MCNC: 0.3 MG/DL (ref 0.2–0.7)
BUN BLDV-MCNC: 13 MG/DL (ref 6–20)
CALCIUM SERPL-MCNC: 9.2 MG/DL (ref 8.5–9.9)
CHLORIDE BLD-SCNC: 104 MEQ/L (ref 95–107)
CHOLESTEROL, TOTAL: 207 MG/DL (ref 0–199)
CO2: 20 MEQ/L (ref 20–31)
CREAT SERPL-MCNC: 0.64 MG/DL (ref 0.5–0.9)
GFR AFRICAN AMERICAN: >60
GFR NON-AFRICAN AMERICAN: >60
GLOBULIN: 2.3 G/DL (ref 2.3–3.5)
GLUCOSE BLD-MCNC: 82 MG/DL (ref 70–99)
HDLC SERPL-MCNC: 56 MG/DL (ref 40–59)
LDL CHOLESTEROL CALCULATED: 132 MG/DL (ref 0–129)
POTASSIUM SERPL-SCNC: 4.9 MEQ/L (ref 3.4–4.9)
SODIUM BLD-SCNC: 141 MEQ/L (ref 135–144)
TOTAL PROTEIN: 6.8 G/DL (ref 6.3–8)
TRIGL SERPL-MCNC: 93 MG/DL (ref 0–150)

## 2022-06-01 PROCEDURE — 99396 PREV VISIT EST AGE 40-64: CPT | Performed by: FAMILY MEDICINE

## 2022-06-01 RX ORDER — ESCITALOPRAM OXALATE 10 MG/1
10 TABLET ORAL DAILY
Qty: 90 TABLET | Refills: 3 | Status: SHIPPED | OUTPATIENT
Start: 2022-06-01

## 2022-06-01 SDOH — ECONOMIC STABILITY: FOOD INSECURITY: WITHIN THE PAST 12 MONTHS, THE FOOD YOU BOUGHT JUST DIDN'T LAST AND YOU DIDN'T HAVE MONEY TO GET MORE.: NEVER TRUE

## 2022-06-01 SDOH — ECONOMIC STABILITY: FOOD INSECURITY: WITHIN THE PAST 12 MONTHS, YOU WORRIED THAT YOUR FOOD WOULD RUN OUT BEFORE YOU GOT MONEY TO BUY MORE.: NEVER TRUE

## 2022-06-01 ASSESSMENT — ENCOUNTER SYMPTOMS
CONSTIPATION: 0
ABDOMINAL PAIN: 0
RHINORRHEA: 0
SORE THROAT: 0
DIARRHEA: 0
SHORTNESS OF BREATH: 0
COUGH: 0
WHEEZING: 0

## 2022-06-01 ASSESSMENT — SOCIAL DETERMINANTS OF HEALTH (SDOH): HOW HARD IS IT FOR YOU TO PAY FOR THE VERY BASICS LIKE FOOD, HOUSING, MEDICAL CARE, AND HEATING?: NOT HARD AT ALL

## 2022-06-01 NOTE — PROGRESS NOTES
[address] PRIMARY CARE  [address] [place] [address]  Dept: [phone]  Dept Fax: [phone]: [phone]     Chief Complaint  Chief Complaint   Patient presents with    Annual Exam     Pt is fasting       HPI:  46 y. o.female who presents for the following:      Works as a ; sees gyn; doing well on the lexapro    Review of Systems   Constitutional: Negative for chills and fever. HENT: Negative for congestion, rhinorrhea and sore throat. Respiratory: Negative for cough, shortness of breath and wheezing. Gastrointestinal: Negative for abdominal pain, constipation and diarrhea. Endocrine: Negative for polydipsia and polyuria. Genitourinary: Negative for dysuria, frequency and urgency. Neurological: Negative for syncope, light-headedness, numbness and headaches. Psychiatric/Behavioral: Negative for sleep disturbance. The patient is not nervous/anxious. Past Medical History:   Diagnosis Date    Healthy adult on routine physical examination     Hot flashes      Past Surgical History:   Procedure Laterality Date    APPENDECTOMY       Social History     Socioeconomic History    Marital status:      Spouse name: Not on file    Number of children: Not on file    Years of education: Not on file    Highest education level: Not on file   Occupational History    Not on file   Tobacco Use    Smoking status: Former Smoker     Packs/day: 0.00     Years: 0.00     Pack years: 0.00     Quit date: 2010     Years since quittin.8    Smokeless tobacco: Never Used   Substance and Sexual Activity    Alcohol use:  Yes     Alcohol/week: 8.3 standard drinks     Types: 10 Standard drinks or equivalent per week     Comment: occ    Drug use: No    Sexual activity: Yes     Partners: Male     Birth control/protection: Condom   Other Topics Concern    Not on file   Social History Neg Hx     Congenital Anomalies Neg Hx     Heart Surgery Neg Hx     Hemophilia Neg Hx     Mental Illness Neg Hx     Migraines Neg Hx     Intellectual Disability Neg Hx     Preeclampsia Neg Hx     Uterine Cancer Neg Hx       No Known Allergies  Current Outpatient Medications   Medication Sig Dispense Refill    escitalopram (LEXAPRO) 10 MG tablet Take 1 tablet by mouth daily 90 tablet 3    Valerian 500 MG CAPS Take by mouth      Multiple Vitamins-Minerals (MULTIVITAMIN PO) Take  by mouth. Current Facility-Administered Medications   Medication Dose Route Frequency Provider Last Rate Last Admin    triamcinolone acetonide (KENALOG-40) injection 40 mg  40 mg IntraMUSCular Once Manpreet Emerson MD             Vitals:    06/01/22 0756   BP: 130/82   Site: Left Upper Arm   Position: Sitting   Cuff Size: Medium Adult   Pulse: 63   SpO2: 98%   Weight: 155 lb (70.3 kg)   Height: 5' 2\" (1.575 m)       Physical exam:  Physical Exam  Vitals reviewed. Constitutional:       General: She is not in acute distress. Appearance: She is well-developed. HENT:      Head: Normocephalic and atraumatic. Right Ear: Tympanic membrane, ear canal and external ear normal. Tympanic membrane is not erythematous. Tympanic membrane has normal mobility. Left Ear: Tympanic membrane, ear canal and external ear normal. Tympanic membrane is not erythematous. Tympanic membrane has normal mobility. Nose: Nose normal.      Mouth/Throat:      Pharynx: No oropharyngeal exudate. Neck:      Thyroid: No thyromegaly. Cardiovascular:      Rate and Rhythm: Normal rate and regular rhythm. Heart sounds: Normal heart sounds. No murmur heard. Pulmonary:      Effort: Pulmonary effort is normal. No respiratory distress. Breath sounds: Normal breath sounds. No wheezing. Abdominal:      General: Bowel sounds are normal. There is no distension. Palpations: Abdomen is soft. Tenderness:  There is no abdominal tenderness. There is no guarding or rebound. Musculoskeletal:      Cervical back: Normal range of motion. Lymphadenopathy:      Cervical: No cervical adenopathy. Skin:     General: Skin is warm and dry. Neurological:      Mental Status: She is alert and oriented to person, place, and time. Psychiatric:         Behavior: Behavior normal.         Assessment/Plan:  46 y.o. female here mainly for annual:  - routine labs and screenings      Diagnosis Orders   1. Annual physical exam  Lipid Panel    Comprehensive Metabolic Panel   2. Perimenopausal  escitalopram (LEXAPRO) 10 MG tablet   3.  Encounter for hepatitis C screening test for low risk patient  Hepatitis C Antibody        Return in about 1 year (around 6/1/2023) for annual exam.    Ann Snow MD

## 2022-06-02 LAB — HEPATITIS C ANTIBODY: NONREACTIVE

## 2022-06-08 ENCOUNTER — HOSPITAL ENCOUNTER (OUTPATIENT)
Dept: WOMENS IMAGING | Age: 52
Discharge: HOME | End: 2022-06-10
Payer: COMMERCIAL

## 2022-06-08 DIAGNOSIS — Z12.31 SCREENING MAMMOGRAM FOR BREAST CANCER: ICD-10-CM

## 2022-06-08 PROCEDURE — 77063 BREAST TOMOSYNTHESIS BI: CPT

## 2022-08-17 ENCOUNTER — OFFICE VISIT (OUTPATIENT)
Dept: INTERNAL MEDICINE | Age: 52
End: 2022-08-17
Payer: COMMERCIAL

## 2022-08-17 VITALS
WEIGHT: 158 LBS | BODY MASS INDEX: 29.08 KG/M2 | DIASTOLIC BLOOD PRESSURE: 88 MMHG | HEIGHT: 62 IN | HEART RATE: 66 BPM | OXYGEN SATURATION: 99 % | TEMPERATURE: 97.4 F | SYSTOLIC BLOOD PRESSURE: 124 MMHG

## 2022-08-17 DIAGNOSIS — S39.012A STRAIN OF FASCIA OF LOWER BACK: ICD-10-CM

## 2022-08-17 DIAGNOSIS — M70.61 GREATER TROCHANTERIC BURSITIS OF RIGHT HIP: Primary | ICD-10-CM

## 2022-08-17 PROCEDURE — G8427 DOCREV CUR MEDS BY ELIG CLIN: HCPCS | Performed by: FAMILY MEDICINE

## 2022-08-17 PROCEDURE — G8419 CALC BMI OUT NRM PARAM NOF/U: HCPCS | Performed by: FAMILY MEDICINE

## 2022-08-17 PROCEDURE — 1036F TOBACCO NON-USER: CPT | Performed by: FAMILY MEDICINE

## 2022-08-17 PROCEDURE — 99213 OFFICE O/P EST LOW 20 MIN: CPT | Performed by: FAMILY MEDICINE

## 2022-08-17 PROCEDURE — 3017F COLORECTAL CA SCREEN DOC REV: CPT | Performed by: FAMILY MEDICINE

## 2022-08-17 RX ORDER — METHYLPREDNISOLONE 4 MG/1
TABLET ORAL
Qty: 1 KIT | Refills: 0 | Status: SHIPPED | OUTPATIENT
Start: 2022-08-17 | End: 2022-08-23

## 2022-08-17 RX ORDER — CYCLOBENZAPRINE HCL 10 MG
10 TABLET ORAL 3 TIMES DAILY PRN
Qty: 30 TABLET | Refills: 0 | Status: SHIPPED | OUTPATIENT
Start: 2022-08-17 | End: 2022-08-27

## 2022-08-17 ASSESSMENT — ENCOUNTER SYMPTOMS
SORE THROAT: 0
RHINORRHEA: 0
CONSTIPATION: 0
DIARRHEA: 0
SHORTNESS OF BREATH: 0
COUGH: 0
WHEEZING: 0
ABDOMINAL PAIN: 0

## 2022-08-17 NOTE — PROGRESS NOTES
[address]  PRIMARY CARE  [address]  [address] [place] [address]  Dept: [phone]  Dept Fax: [phone]: [phone]     Chief Complaint  Chief Complaint   Patient presents with    Back Pain     X1 month, lower back, into right hip, worse at night       HPI:  46 y. o.female who presents for the following:      LBP: x1.5mo; started after planting a large garden and being bent over long periods; R lower back and down the R buttock; worse with sitting or bending too long and then trying to get up; stiffness; tried different positionings; ibuprofen; bothers while sleeping; some days are good when she goes walking    Review of Systems   Constitutional:  Negative for chills and fever. HENT:  Negative for congestion, rhinorrhea and sore throat. Respiratory:  Negative for cough, shortness of breath and wheezing. Gastrointestinal:  Negative for abdominal pain, constipation and diarrhea. Endocrine: Negative for polydipsia and polyuria. Genitourinary:  Negative for dysuria, frequency and urgency. Musculoskeletal:  Positive for arthralgias. Neurological:  Negative for syncope, light-headedness, numbness and headaches. Psychiatric/Behavioral:  Negative for sleep disturbance. The patient is not nervous/anxious.       Past Medical History:   Diagnosis Date    Healthy adult on routine physical examination     Hot flashes      Past Surgical History:   Procedure Laterality Date    APPENDECTOMY       Social History     Socioeconomic History    Marital status:      Spouse name: Not on file    Number of children: Not on file    Years of education: Not on file    Highest education level: Not on file   Occupational History    Not on file   Tobacco Use    Smoking status: Former     Packs/day: 0.00     Years: 0.00     Pack years: 0.00     Types: Cigarettes     Quit date: 2010     Years since quittin.0    Smokeless

## 2022-10-07 ENCOUNTER — OFFICE VISIT (OUTPATIENT)
Dept: FAMILY MEDICINE CLINIC | Age: 52
End: 2022-10-07
Payer: COMMERCIAL

## 2022-10-07 VITALS
SYSTOLIC BLOOD PRESSURE: 134 MMHG | DIASTOLIC BLOOD PRESSURE: 88 MMHG | HEART RATE: 74 BPM | WEIGHT: 162.2 LBS | HEIGHT: 62 IN | TEMPERATURE: 97.7 F | BODY MASS INDEX: 29.85 KG/M2 | RESPIRATION RATE: 16 BRPM | OXYGEN SATURATION: 96 %

## 2022-10-07 DIAGNOSIS — J03.90 ACUTE TONSILLITIS, UNSPECIFIED ETIOLOGY: Primary | ICD-10-CM

## 2022-10-07 LAB — S PYO AG THROAT QL: NORMAL

## 2022-10-07 PROCEDURE — G8484 FLU IMMUNIZE NO ADMIN: HCPCS | Performed by: NURSE PRACTITIONER

## 2022-10-07 PROCEDURE — G8427 DOCREV CUR MEDS BY ELIG CLIN: HCPCS | Performed by: NURSE PRACTITIONER

## 2022-10-07 PROCEDURE — 87880 STREP A ASSAY W/OPTIC: CPT | Performed by: NURSE PRACTITIONER

## 2022-10-07 PROCEDURE — 99213 OFFICE O/P EST LOW 20 MIN: CPT | Performed by: NURSE PRACTITIONER

## 2022-10-07 PROCEDURE — G8419 CALC BMI OUT NRM PARAM NOF/U: HCPCS | Performed by: NURSE PRACTITIONER

## 2022-10-07 PROCEDURE — 3017F COLORECTAL CA SCREEN DOC REV: CPT | Performed by: NURSE PRACTITIONER

## 2022-10-07 PROCEDURE — 1036F TOBACCO NON-USER: CPT | Performed by: NURSE PRACTITIONER

## 2022-10-07 RX ORDER — AMOXICILLIN AND CLAVULANATE POTASSIUM 875; 125 MG/1; MG/1
1 TABLET, FILM COATED ORAL 2 TIMES DAILY
Qty: 20 TABLET | Refills: 0 | Status: SHIPPED | OUTPATIENT
Start: 2022-10-07 | End: 2022-10-17

## 2022-10-07 ASSESSMENT — ENCOUNTER SYMPTOMS
SINUS PAIN: 0
SINUS PRESSURE: 0
COUGH: 0
SORE THROAT: 1

## 2022-10-07 NOTE — PROGRESS NOTES
[address] [facility] [address] [facility] PRIMARY CARE  [address] [address]  Dept: [phone]  Dept Fax: [phone]  Loc: [phone]     Magdalena Arvizu 46 y.o. female presents 10/7/22 with   Chief Complaint   Patient presents with    Pharyngitis     Left sided sore throat with difficulty swallowing. HPI    Pharyngitis  This is a new problem. The current episode started in the past 7 days. The problem has been gradually worsening. Associated symptoms include fatigue, myalgias and a sore throat. Pertinent negatives include no abdominal pain, chills, congestion, coughing, fever, nausea or vomiting. Otalgia   Associated symptoms include a sore throat. Pertinent negatives include no abdominal pain, coughing, diarrhea, rhinorrhea or vomiting. Has tried drinking Hot tea, and taking tylenol past couple mornings with no relief. Feels like her left tonsil is very large and covered with multiple white spots.     Reviewed the following history:    Past Medical History:   Diagnosis Date    Healthy adult on routine physical examination     Hot flashes      Past Surgical History:   Procedure Laterality Date    APPENDECTOMY       Family History   Problem Relation Age of Onset    Other Mother         benign tumor on optic nerve    Cancer Father 64        lung    Heart Disease Maternal Grandmother     Other Maternal Grandfather         COPD    Other Paternal Grandmother         annurysm    Other Paternal Grandfather         alzheimers    Stroke Paternal Grandfather     Breast Cancer Neg Hx     Colon Cancer Neg Hx     Diabetes Neg Hx     Eclampsia Neg Hx     Hypertension Neg Hx     Ovarian Cancer Neg Hx      Labor Neg Hx     Spont Abortions Neg Hx     Bleeding Prob Neg Hx     Asthma Neg Hx     Deep Vein Thrombosis Neg Hx     Congenital Anomalies Neg Hx     Heart Surgery Neg Hx     Hemophilia Neg Hx     Mental Illness Neg Hx Migraines Neg Hx     Intellectual Disability Neg Hx     Preeclampsia Neg Hx     Uterine Cancer Neg Hx        No Known Allergies    Current Outpatient Medications on File Prior to Visit   Medication Sig Dispense Refill    IBUPROFEN PO Take by mouth      escitalopram (LEXAPRO) 10 MG tablet Take 1 tablet by mouth daily 90 tablet 3    Valerian 500 MG CAPS Take by mouth      Multiple Vitamins-Minerals (MULTIVITAMIN PO) Take  by mouth. Current Facility-Administered Medications on File Prior to Visit   Medication Dose Route Frequency Provider Last Rate Last Admin    triamcinolone acetonide (KENALOG-40) injection 40 mg  40 mg IntraMUSCular Once Althea MD Brian           Review of Systems   Constitutional:  Positive for fatigue. Negative for chills and fever. HENT:  Positive for ear pain and sore throat. Negative for congestion, sinus pressure and sinus pain. Respiratory:  Negative for cough. Musculoskeletal:  Positive for myalgias. Objective    Vitals:    10/07/22 1319   BP: 134/88   Site: Right Upper Arm   Position: Sitting   Cuff Size: Medium Adult   Pulse: 74   Resp: 16   Temp: 97.7 °F (36.5 °C)   SpO2: 96%   Weight: 162 lb 3.2 oz (73.6 kg)   Height: 5' 2\" (1.575 m)       Physical Exam  Constitutional:       General: She is not in acute distress. Appearance: Normal appearance. She is not ill-appearing or toxic-appearing. HENT:      Head: Normocephalic and atraumatic. Right Ear: Hearing and external ear normal. No middle ear effusion. Tympanic membrane is not erythematous or bulging. Left Ear: Hearing and external ear normal. A middle ear effusion is present. Tympanic membrane is erythematous and bulging. Mouth/Throat:      Pharynx: Posterior oropharyngeal erythema present. Tonsils: Tonsillar exudate present. Eyes:      General: Lids are normal.      Conjunctiva/sclera: Conjunctivae normal.   Cardiovascular:      Rate and Rhythm: Normal rate and regular rhythm.       Heart sounds: Normal heart sounds. Pulmonary:      Effort: Pulmonary effort is normal. No respiratory distress. Breath sounds: Normal breath sounds. No decreased breath sounds, wheezing, rhonchi or rales. Musculoskeletal:      Cervical back: Normal range of motion and neck supple. Skin:     General: Skin is warm and dry. Neurological:      General: No focal deficit present. Mental Status: She is alert and oriented to person, place, and time. Psychiatric:         Attention and Perception: Attention normal.         Mood and Affect: Mood normal.         Speech: Speech normal.         Behavior: Behavior normal.         Thought Content: Thought content normal.         Cognition and Memory: Cognition normal.         Judgment: Judgment normal.     POC Testing Today:   Results for POC orders placed in visit on 10/07/22   POCT rapid strep A   Result Value Ref Range    Strep A Ag None Detected None Detected       Assessment and Plan    Cleopatra Cheung 46 y.o. female presenting for tonsillitis     1. Acute tonsillitis, unspecified etiology  - POCT rapid strep A  - amoxicillin-clavulanate (AUGMENTIN) 875-125 MG per tablet; Take 1 tablet by mouth 2 times daily for 10 days  Dispense: 20 tablet; Refill: 0  - Culture, Throat; Future      Procedures:  Unless otherwise noted below, none    Return if symptoms worsen or fail to improve, for follow up. Side effects and adverse effects of any medication prescribed today, as well as treatment plan/rationale, follow-up care, and result expectations have been discussed with the patient. Expresses understanding and desires to proceed with treatment plan. The patient was reminded that if an antibiotic has been prescribed the predicted course is improvement to cure with no persistent issues. Take antibiotics as directed. If any problems occur, an appointment should be made or ER visit if severe. Because of the risk with ANY antibiotic of C.  Difficile colitis if persistent diarrhea or abdominal pain or any concerning symptoms, we should be notified. To reduce this risk, a probiotic pill, yogurt or other preparations containing active cultures should be ingested daily -particularly while on the antibiotic. If any persistent symptoms of illness, follow up appointment should be made in a timely fashion with a physician. Discussed signs and symptoms which require immediate follow-up in ED/call to 911. Understanding verbalized. I have reviewed and updated the electronic medical record.     Albert Feliz, DYLAN - CNP

## 2022-12-29 ENCOUNTER — OFFICE VISIT (OUTPATIENT)
Dept: FAMILY MEDICINE CLINIC | Age: 52
End: 2022-12-29
Payer: COMMERCIAL

## 2022-12-29 VITALS
BODY MASS INDEX: 29.44 KG/M2 | SYSTOLIC BLOOD PRESSURE: 126 MMHG | WEIGHT: 160 LBS | OXYGEN SATURATION: 96 % | DIASTOLIC BLOOD PRESSURE: 88 MMHG | TEMPERATURE: 98.2 F | HEART RATE: 82 BPM | HEIGHT: 62 IN

## 2022-12-29 DIAGNOSIS — J10.1 INFLUENZA A: Primary | ICD-10-CM

## 2022-12-29 LAB
INFLUENZA A ANTIBODY: POSITIVE
INFLUENZA B ANTIBODY: NEGATIVE
Lab: NORMAL
PERFORMING INSTRUMENT: NORMAL
QC PASS/FAIL: NORMAL
SARS-COV-2, POC: NORMAL

## 2022-12-29 PROCEDURE — 87804 INFLUENZA ASSAY W/OPTIC: CPT | Performed by: FAMILY MEDICINE

## 2022-12-29 PROCEDURE — 87426 SARSCOV CORONAVIRUS AG IA: CPT | Performed by: FAMILY MEDICINE

## 2022-12-29 PROCEDURE — 3017F COLORECTAL CA SCREEN DOC REV: CPT | Performed by: FAMILY MEDICINE

## 2022-12-29 PROCEDURE — G8419 CALC BMI OUT NRM PARAM NOF/U: HCPCS | Performed by: FAMILY MEDICINE

## 2022-12-29 PROCEDURE — 1036F TOBACCO NON-USER: CPT | Performed by: FAMILY MEDICINE

## 2022-12-29 PROCEDURE — G8484 FLU IMMUNIZE NO ADMIN: HCPCS | Performed by: FAMILY MEDICINE

## 2022-12-29 PROCEDURE — 99213 OFFICE O/P EST LOW 20 MIN: CPT | Performed by: FAMILY MEDICINE

## 2022-12-29 PROCEDURE — G8427 DOCREV CUR MEDS BY ELIG CLIN: HCPCS | Performed by: FAMILY MEDICINE

## 2022-12-29 RX ORDER — OSELTAMIVIR PHOSPHATE 75 MG/1
75 CAPSULE ORAL 2 TIMES DAILY
Qty: 10 CAPSULE | Refills: 0 | Status: SHIPPED | OUTPATIENT
Start: 2022-12-29 | End: 2023-01-03

## 2022-12-29 ASSESSMENT — ENCOUNTER SYMPTOMS
COUGH: 1
SORE THROAT: 0
RHINORRHEA: 1
CONSTIPATION: 0
WHEEZING: 0
SHORTNESS OF BREATH: 0
DIARRHEA: 0
ABDOMINAL PAIN: 0

## 2022-12-29 NOTE — LETTER
[facility] Primary Care  [address] [address]  Phone: [phone]  Fax: [phone]    Lauren Cedillo MD        December 29, 2022     Patient: Yefri Ryan   YOB: 1970   Date of Visit: 12/29/2022       To Whom It May Concern:    Please excuse for the absence. Yefri Ryan may return to work 12/31/22. If you have any questions or concerns, please don't hesitate to call.     Sincerely,        Lauren Cedillo MD

## 2022-12-29 NOTE — PROGRESS NOTES
[address] [facility] [address] [facility] PRIMARY CARE  [address] [place] [address]  Dept: [phone]  Dept Fax: [phone]  Loc: [phone]     Chief Complaint  Chief Complaint   Patient presents with    Cough     Wheezing, congestion, nasal drainage, slightly productive cough, chills, sweats, x2 days       HPI:  46 y. o.female who presents for the following:      URI symptoms: x2 days with cough, congestion, runny nose, chills, sweats; no fevers; tolerating PO; no n/v; taking mucinex    Review of Systems   Constitutional:  Positive for chills and diaphoresis. Negative for fever. HENT:  Positive for congestion and rhinorrhea. Negative for sore throat. Respiratory:  Positive for cough. Negative for shortness of breath and wheezing. Gastrointestinal:  Negative for abdominal pain, constipation and diarrhea. Endocrine: Negative for polydipsia and polyuria. Genitourinary:  Negative for dysuria, frequency and urgency. Neurological:  Negative for syncope, light-headedness, numbness and headaches. Psychiatric/Behavioral:  Negative for sleep disturbance. The patient is not nervous/anxious. Past Medical History:   Diagnosis Date    Healthy adult on routine physical examination     Hot flashes      Past Surgical History:   Procedure Laterality Date    APPENDECTOMY       Social History     Socioeconomic History    Marital status:      Spouse name: Not on file    Number of children: Not on file    Years of education: Not on file    Highest education level: Not on file   Occupational History    Not on file   Tobacco Use    Smoking status: Former     Packs/day: 0.00     Years: 0.00     Pack years: 0.00     Types: Cigarettes     Quit date: 2010     Years since quittin.3    Smokeless tobacco: Never   Substance and Sexual Activity    Alcohol use:  Yes     Alcohol/week: 8.3 standard drinks     Types: 10 Standard drinks or equivalent per week     Comment: occ    Drug use: No    Sexual activity: Yes     Partners: Male     Birth control/protection: Condom   Other Topics Concern    Not on file   Social History Narrative    Not on file     Social Determinants of Health     Financial Resource Strain: Low Risk     Difficulty of Paying Living Expenses: Not hard at all   Food Insecurity: No Food Insecurity    Worried About Running Out of Food in the Last Year: Never true    Ran Out of Food in the Last Year: Never true   Transportation Needs: Not on file   Physical Activity: Not on file   Stress: Not on file   Social Connections: Not on file   Intimate Partner Violence: Not on file   Housing Stability: Not on file     Family History   Problem Relation Age of Onset    Other Mother         benign tumor on optic nerve    Cancer Father 64        lung    Heart Disease Maternal Grandmother     Other Maternal Grandfather         COPD    Other Paternal Grandmother         annurysm    Other Paternal Grandfather         alzheimers    Stroke Paternal Grandfather     Breast Cancer Neg Hx     Colon Cancer Neg Hx     Diabetes Neg Hx     Eclampsia Neg Hx     Hypertension Neg Hx     Ovarian Cancer Neg Hx      Labor Neg Hx     Spont Abortions Neg Hx     Bleeding Prob Neg Hx     Asthma Neg Hx     Deep Vein Thrombosis Neg Hx     Congenital Anomalies Neg Hx     Heart Surgery Neg Hx     Hemophilia Neg Hx     Mental Illness Neg Hx     Migraines Neg Hx     Intellectual Disability Neg Hx     Preeclampsia Neg Hx     Uterine Cancer Neg Hx       No Known Allergies  Current Outpatient Medications   Medication Sig Dispense Refill    guaiFENesin (MUCINEX PO) Take by mouth      oseltamivir (TAMIFLU) 75 MG capsule Take 1 capsule by mouth 2 times daily for 5 days 10 capsule 0    IBUPROFEN PO Take by mouth      escitalopram (LEXAPRO) 10 MG tablet Take 1 tablet by mouth daily 90 tablet 3    Valerian 500 MG CAPS Take by mouth      Multiple Vitamins-Minerals (MULTIVITAMIN PO) Take  by mouth.       Current Facility-Administered Medications   Medication Dose Route Frequency Provider Last Rate Last Admin    triamcinolone acetonide (KENALOG-40) injection 40 mg  40 mg IntraMUSCular Once Krystian Wallace MD             Vitals:    12/29/22 1537   BP: 126/88   Pulse: 82   Temp: 98.2 °F (36.8 °C)   TempSrc: Infrared   SpO2: 96%   Weight: 160 lb (72.6 kg)   Height: 5' 2\" (1.575 m)       Physical exam:  Physical Exam  Vitals reviewed. Constitutional:       General: She is not in acute distress. Appearance: She is well-developed. HENT:      Head: Normocephalic and atraumatic. Right Ear: Tympanic membrane, ear canal and external ear normal. Tympanic membrane is not erythematous. Tympanic membrane has normal mobility. Left Ear: Tympanic membrane, ear canal and external ear normal. Tympanic membrane is not erythematous. Tympanic membrane has normal mobility. Nose: Nose normal.      Mouth/Throat:      Pharynx: No oropharyngeal exudate. Neck:      Thyroid: No thyromegaly. Cardiovascular:      Rate and Rhythm: Normal rate and regular rhythm. Heart sounds: Normal heart sounds. No murmur heard. Pulmonary:      Effort: Pulmonary effort is normal. No respiratory distress. Breath sounds: Normal breath sounds. No wheezing. Abdominal:      General: Bowel sounds are normal. There is no distension. Palpations: Abdomen is soft. Tenderness: There is no abdominal tenderness. There is no guarding or rebound. Musculoskeletal:      Cervical back: Normal range of motion. Lymphadenopathy:      Cervical: No cervical adenopathy. Skin:     General: Skin is warm and dry. Neurological:      Mental Status: She is alert and oriented to person, place, and time. Psychiatric:         Behavior: Behavior normal.       Assessment/Plan:  46 y.o. female here mainly for Flu A:  - course of tamilflu and supportive care     Diagnosis Orders   1.  Influenza A  POCT COVID-19, Antigen    POCT Influenza A/B    oseltamivir (TAMIFLU) 75 MG capsule           Return if symptoms worsen or fail to improve.     Shahzad Sevilla MD

## 2023-06-05 DIAGNOSIS — N95.1 PERIMENOPAUSAL: ICD-10-CM

## 2023-06-05 RX ORDER — ESCITALOPRAM OXALATE 10 MG/1
10 TABLET ORAL DAILY
Qty: 90 TABLET | Refills: 1 | Status: SHIPPED | OUTPATIENT
Start: 2023-06-05 | End: 2023-06-14

## 2023-06-05 NOTE — TELEPHONE ENCOUNTER
Comments:     Last Office Visit (last PCP visit):   12/29/2022    Next Visit Date:  No future appointments. **If hasn't been seen in over a year OR hasn't followed up according to last diabetes/ADHD visit, make appointment for patient before sending refill to provider.     Rx requested:  Requested Prescriptions     Pending Prescriptions Disp Refills    escitalopram (LEXAPRO) 10 MG tablet 90 tablet 3     Sig: Take 1 tablet by mouth daily

## 2023-06-07 ENCOUNTER — TELEPHONE (OUTPATIENT)
Dept: INTERNAL MEDICINE | Age: 53
End: 2023-06-07

## 2023-06-07 NOTE — TELEPHONE ENCOUNTER
----- Message from Anant Ayala sent at 6/7/2023  9:45 AM EDT -----  Subject: Referral Request    Reason for referral request? Patient has a yearly physical on 06/14 w/ Dr. Kamilah Enamorado at [facility]. Would like to have blood work and A1C done in the   morning.   Provider patient wants to be referred to(if known): Shashi Ariza    Provider Phone Number(if known):[phone]    Additional Information for Provider?   ---------------------------------------------------------------------------  --------------  [address] [employer]    [phone]; OK to leave message on voicemail  ---------------------------------------------------------------------------  --------------

## 2023-06-14 DIAGNOSIS — Z00.00 ANNUAL PHYSICAL EXAM: ICD-10-CM

## 2023-06-14 LAB
ALBUMIN SERPL-MCNC: 4.5 G/DL (ref 3.5–4.6)
ALP SERPL-CCNC: 89 U/L (ref 40–130)
ALT SERPL-CCNC: 38 U/L (ref 0–33)
ANION GAP SERPL CALCULATED.3IONS-SCNC: 10 MEQ/L (ref 9–15)
AST SERPL-CCNC: 32 U/L (ref 0–35)
BILIRUB SERPL-MCNC: 0.4 MG/DL (ref 0.2–0.7)
BUN SERPL-MCNC: 9 MG/DL (ref 6–20)
CALCIUM SERPL-MCNC: 9.8 MG/DL (ref 8.5–9.9)
CHLORIDE SERPL-SCNC: 104 MEQ/L (ref 95–107)
CHOLEST SERPL-MCNC: 221 MG/DL (ref 0–199)
CO2 SERPL-SCNC: 25 MEQ/L (ref 20–31)
CREAT SERPL-MCNC: 0.61 MG/DL (ref 0.5–0.9)
GLOBULIN SER CALC-MCNC: 2.6 G/DL (ref 2.3–3.5)
GLUCOSE FASTING: 98 MG/DL (ref 70–99)
HBA1C MFR BLD: 5.3 % (ref 4.8–5.9)
HDLC SERPL-MCNC: 63 MG/DL (ref 40–59)
LDL CHOLESTEROL CALCULATED: 144 MG/DL (ref 0–129)
POTASSIUM SERPL-SCNC: 5.5 MEQ/L (ref 3.4–4.9)
PROT SERPL-MCNC: 7.1 G/DL (ref 6.3–8)
SODIUM SERPL-SCNC: 139 MEQ/L (ref 135–144)
TRIGLYCERIDE, FASTING: 69 MG/DL (ref 0–150)

## 2023-08-24 ENCOUNTER — HOSPITAL ENCOUNTER (OUTPATIENT)
Dept: WOMENS IMAGING | Age: 53
Discharge: HOME | End: 2023-08-26
Attending: OBSTETRICS & GYNECOLOGY
Payer: COMMERCIAL

## 2023-08-24 DIAGNOSIS — Z12.31 SCREENING MAMMOGRAM FOR BREAST CANCER: ICD-10-CM

## 2023-08-24 PROCEDURE — 77063 BREAST TOMOSYNTHESIS BI: CPT

## 2024-04-05 ENCOUNTER — PATIENT MESSAGE (OUTPATIENT)
Dept: INTERNAL MEDICINE | Age: 54
End: 2024-04-05

## 2024-04-05 DIAGNOSIS — Z12.11 COLON CANCER SCREENING: Primary | ICD-10-CM

## 2024-04-05 NOTE — TELEPHONE ENCOUNTER
From: Izabella Cheung  To: Dr. Isauro Foreman  Sent: 4/5/2024 8:23 AM EDT  Subject: Colon cancer screening    Are you able to request the Cologuard screening test through my insurance and have it home delivered? I was due in 1/2024. Thank you Izabella Cheung.

## 2024-05-02 LAB — NONINV COLON CA DNA+OCC BLD SCRN STL QL: NEGATIVE

## 2024-06-11 ASSESSMENT — PATIENT HEALTH QUESTIONNAIRE - PHQ9
SUM OF ALL RESPONSES TO PHQ QUESTIONS 1-9: 0
2. FEELING DOWN, DEPRESSED OR HOPELESS: NOT AT ALL
SUM OF ALL RESPONSES TO PHQ9 QUESTIONS 1 & 2: 0
1. LITTLE INTEREST OR PLEASURE IN DOING THINGS: NOT AT ALL
1. LITTLE INTEREST OR PLEASURE IN DOING THINGS: NOT AT ALL
SUM OF ALL RESPONSES TO PHQ QUESTIONS 1-9: 0
2. FEELING DOWN, DEPRESSED OR HOPELESS: NOT AT ALL
SUM OF ALL RESPONSES TO PHQ9 QUESTIONS 1 & 2: 0
SUM OF ALL RESPONSES TO PHQ QUESTIONS 1-9: 0
SUM OF ALL RESPONSES TO PHQ QUESTIONS 1-9: 0

## 2024-06-18 ENCOUNTER — OFFICE VISIT (OUTPATIENT)
Dept: FAMILY MEDICINE CLINIC | Age: 54
End: 2024-06-18
Payer: COMMERCIAL

## 2024-06-18 ENCOUNTER — HOSPITAL ENCOUNTER (OUTPATIENT)
Age: 54
Setting detail: SPECIMEN
Discharge: HOME | End: 2024-06-18
Payer: COMMERCIAL

## 2024-06-18 VITALS
DIASTOLIC BLOOD PRESSURE: 84 MMHG | WEIGHT: 159 LBS | OXYGEN SATURATION: 98 % | HEIGHT: 62 IN | BODY MASS INDEX: 29.26 KG/M2 | SYSTOLIC BLOOD PRESSURE: 120 MMHG | HEART RATE: 67 BPM

## 2024-06-18 DIAGNOSIS — Z00.00 ANNUAL PHYSICAL EXAM: ICD-10-CM

## 2024-06-18 DIAGNOSIS — N95.1 PERIMENOPAUSAL: ICD-10-CM

## 2024-06-18 DIAGNOSIS — Z00.00 ANNUAL PHYSICAL EXAM: Primary | ICD-10-CM

## 2024-06-18 LAB
ALBUMIN SERPL-MCNC: 4.5 G/DL (ref 3.5–4.6)
ALP SERPL-CCNC: 87 U/L (ref 40–130)
ALT SERPL-CCNC: 16 U/L (ref 0–33)
ANION GAP SERPL CALCULATED.3IONS-SCNC: 12 MEQ/L (ref 9–15)
AST SERPL-CCNC: 23 U/L (ref 0–35)
BILIRUB SERPL-MCNC: 0.4 MG/DL (ref 0.2–0.7)
BUN SERPL-MCNC: 13 MG/DL (ref 6–20)
CALCIUM SERPL-MCNC: 9.4 MG/DL (ref 8.5–9.9)
CHLORIDE SERPL-SCNC: 103 MEQ/L (ref 95–107)
CHOLEST SERPL-MCNC: 211 MG/DL (ref 0–199)
CO2 SERPL-SCNC: 23 MEQ/L (ref 20–31)
CREAT SERPL-MCNC: 0.68 MG/DL (ref 0.5–0.9)
GLOBULIN SER CALC-MCNC: 2.4 G/DL (ref 2.3–3.5)
GLUCOSE FASTING: 98 MG/DL (ref 70–99)
HDLC SERPL-MCNC: 52 MG/DL (ref 40–59)
LDL CHOLESTEROL: 141 MG/DL (ref 0–129)
POTASSIUM SERPL-SCNC: 5.3 MEQ/L (ref 3.4–4.9)
PROT SERPL-MCNC: 6.9 G/DL (ref 6.3–8)
SODIUM SERPL-SCNC: 138 MEQ/L (ref 135–144)
TRIGLYCERIDE, FASTING: 92 MG/DL (ref 0–150)

## 2024-06-18 PROCEDURE — 83036 HEMOGLOBIN GLYCOSYLATED A1C: CPT

## 2024-06-18 PROCEDURE — 80061 LIPID PANEL: CPT

## 2024-06-18 PROCEDURE — 80053 COMPREHEN METABOLIC PANEL: CPT

## 2024-06-18 PROCEDURE — 36415 COLL VENOUS BLD VENIPUNCTURE: CPT | Performed by: FAMILY MEDICINE

## 2024-06-18 PROCEDURE — 99396 PREV VISIT EST AGE 40-64: CPT | Performed by: FAMILY MEDICINE

## 2024-06-18 RX ORDER — ESCITALOPRAM OXALATE 20 MG/1
20 TABLET ORAL DAILY
Qty: 90 TABLET | Refills: 3 | Status: SHIPPED | OUTPATIENT
Start: 2024-06-18

## 2024-06-18 SDOH — ECONOMIC STABILITY: FOOD INSECURITY: WITHIN THE PAST 12 MONTHS, THE FOOD YOU BOUGHT JUST DIDN'T LAST AND YOU DIDN'T HAVE MONEY TO GET MORE.: NEVER TRUE

## 2024-06-18 SDOH — ECONOMIC STABILITY: FOOD INSECURITY: WITHIN THE PAST 12 MONTHS, YOU WORRIED THAT YOUR FOOD WOULD RUN OUT BEFORE YOU GOT MONEY TO BUY MORE.: NEVER TRUE

## 2024-06-18 SDOH — ECONOMIC STABILITY: INCOME INSECURITY: HOW HARD IS IT FOR YOU TO PAY FOR THE VERY BASICS LIKE FOOD, HOUSING, MEDICAL CARE, AND HEATING?: NOT HARD AT ALL

## 2024-06-18 SDOH — ECONOMIC STABILITY: HOUSING INSECURITY
IN THE LAST 12 MONTHS, WAS THERE A TIME WHEN YOU DID NOT HAVE A STEADY PLACE TO SLEEP OR SLEPT IN A SHELTER (INCLUDING NOW)?: NO

## 2024-06-18 ASSESSMENT — ENCOUNTER SYMPTOMS
WHEEZING: 0
RHINORRHEA: 0
SHORTNESS OF BREATH: 0
SORE THROAT: 0
DIARRHEA: 0
COUGH: 0
CONSTIPATION: 0
ABDOMINAL PAIN: 0

## 2024-06-18 NOTE — PROGRESS NOTES
Refill    escitalopram (LEXAPRO) 20 MG tablet Take 1 tablet by mouth daily 90 tablet 3    IBUPROFEN PO Take by mouth      Valerian 500 MG CAPS Take by mouth      Multiple Vitamins-Minerals (MULTIVITAMIN PO) Take  by mouth.       Current Facility-Administered Medications   Medication Dose Route Frequency Provider Last Rate Last Admin    triamcinolone acetonide (KENALOG-40) injection 40 mg  40 mg IntraMUSCular Once Isauro Foreman MD

## 2024-06-19 LAB
ESTIMATED AVERAGE GLUCOSE: 105 MG/DL
HBA1C MFR BLD: 5.3 % (ref 4–6)

## 2024-08-04 DIAGNOSIS — N95.1 PERIMENOPAUSAL: ICD-10-CM

## 2024-08-05 RX ORDER — ESCITALOPRAM OXALATE 20 MG/1
20 TABLET ORAL DAILY
Qty: 90 TABLET | Refills: 1 | Status: SHIPPED | OUTPATIENT
Start: 2024-08-05

## 2024-10-30 ENCOUNTER — OFFICE VISIT (OUTPATIENT)
Dept: OBGYN CLINIC | Age: 54
End: 2024-10-30
Payer: COMMERCIAL

## 2024-10-30 VITALS
SYSTOLIC BLOOD PRESSURE: 128 MMHG | BODY MASS INDEX: 30.36 KG/M2 | WEIGHT: 165 LBS | HEIGHT: 62 IN | DIASTOLIC BLOOD PRESSURE: 76 MMHG

## 2024-10-30 DIAGNOSIS — Z01.419 ENCOUNTER FOR WELL WOMAN EXAM WITH ROUTINE GYNECOLOGICAL EXAM: Primary | ICD-10-CM

## 2024-10-30 DIAGNOSIS — Z12.31 SCREENING MAMMOGRAM FOR BREAST CANCER: ICD-10-CM

## 2024-10-30 PROCEDURE — 99396 PREV VISIT EST AGE 40-64: CPT | Performed by: OBSTETRICS & GYNECOLOGY

## 2024-10-30 ASSESSMENT — ENCOUNTER SYMPTOMS
CONSTIPATION: 0
DIARRHEA: 0
BLOOD IN STOOL: 0
RECTAL PAIN: 0
RESPIRATORY NEGATIVE: 1
ABDOMINAL PAIN: 0
NAUSEA: 0
ALLERGIC/IMMUNOLOGIC NEGATIVE: 1
EYES NEGATIVE: 1
ABDOMINAL DISTENTION: 0
ANAL BLEEDING: 0
VOMITING: 0

## 2024-10-30 ASSESSMENT — VISUAL ACUITY: OU: 1

## 2024-10-30 NOTE — PROGRESS NOTES
Subjective:      Patient ID: Izabella Cheung is a 54 y.o. female    Annual exam. Reviewed medical, surgical, social and family history.  Also reviewed current medications and allergies.  No PMB.  No GYN complaints.  Pap deferred ( negative co-testing ; next pap  ).   Screening mammogram ordered.  Monthly SBE encouraged.  Screening colonoscopy recommended per routine.  F/U annual exam or prn.      Vitals:  /76   Ht 1.575 m (5' 2\")   Wt 74.8 kg (165 lb)   LMP 2021 (Approximate)   BMI 30.18 kg/m²   Past Medical History:   Diagnosis Date    Healthy adult on routine physical examination     Hot flashes      Past Surgical History:   Procedure Laterality Date    APPENDECTOMY       Allergies:  Patient has no known allergies.  Current Outpatient Medications   Medication Sig Dispense Refill    escitalopram (LEXAPRO) 20 MG tablet Take 1 tablet by mouth daily 90 tablet 1    IBUPROFEN PO Take by mouth      Valerian 500 MG CAPS Take by mouth      Multiple Vitamins-Minerals (MULTIVITAMIN PO) Take  by mouth.       Current Facility-Administered Medications   Medication Dose Route Frequency Provider Last Rate Last Admin    triamcinolone acetonide (KENALOG-40) injection 40 mg  40 mg IntraMUSCular Once Isauro Foreman MD         Social History     Socioeconomic History    Marital status:      Spouse name: Not on file    Number of children: Not on file    Years of education: Not on file    Highest education level: Not on file   Occupational History    Not on file   Tobacco Use    Smoking status: Former     Current packs/day: 0.00     Types: Cigarettes     Quit date: 2010     Years since quittin.2     Passive exposure: Never    Smokeless tobacco: Never   Substance and Sexual Activity    Alcohol use: Yes     Alcohol/week: 6.0 standard drinks of alcohol     Types: 6 Cans of beer per week     Comment: occ    Drug use: No    Sexual activity: Yes     Partners: Male     Birth control/protection: Condom

## 2024-10-30 NOTE — PROGRESS NOTES
The patient was asked if she would like a chaperone present for her intimate exam. She  Declined the chaperone. Rick Senior CMA (AAMA)

## 2024-11-13 ENCOUNTER — HOSPITAL ENCOUNTER (OUTPATIENT)
Dept: WOMENS IMAGING | Age: 54
Discharge: HOME | End: 2024-11-15
Attending: OBSTETRICS & GYNECOLOGY
Payer: COMMERCIAL

## 2024-11-13 DIAGNOSIS — Z12.31 SCREENING MAMMOGRAM FOR BREAST CANCER: ICD-10-CM

## 2024-11-13 PROCEDURE — 77063 BREAST TOMOSYNTHESIS BI: CPT

## 2024-12-23 ENCOUNTER — OFFICE VISIT (OUTPATIENT)
Dept: FAMILY MEDICINE CLINIC | Age: 54
End: 2024-12-23
Payer: COMMERCIAL

## 2024-12-23 VITALS
SYSTOLIC BLOOD PRESSURE: 138 MMHG | OXYGEN SATURATION: 98 % | BODY MASS INDEX: 31.38 KG/M2 | TEMPERATURE: 97.7 F | DIASTOLIC BLOOD PRESSURE: 78 MMHG | WEIGHT: 166.2 LBS | HEART RATE: 95 BPM | HEIGHT: 61 IN

## 2024-12-23 DIAGNOSIS — J06.9 UPPER RESPIRATORY INFECTION, ACUTE: ICD-10-CM

## 2024-12-23 DIAGNOSIS — H10.32 ACUTE BACTERIAL CONJUNCTIVITIS OF LEFT EYE: Primary | ICD-10-CM

## 2024-12-23 PROCEDURE — 99213 OFFICE O/P EST LOW 20 MIN: CPT | Performed by: INTERNAL MEDICINE

## 2024-12-23 RX ORDER — OFLOXACIN 3 MG/ML
1 SOLUTION/ DROPS OPHTHALMIC 4 TIMES DAILY
Qty: 5 ML | Refills: 1 | Status: SHIPPED | OUTPATIENT
Start: 2024-12-23 | End: 2025-01-02

## 2024-12-23 RX ORDER — AZITHROMYCIN 500 MG/1
500 TABLET, FILM COATED ORAL DAILY
Qty: 7 TABLET | Refills: 0 | Status: SHIPPED | OUTPATIENT
Start: 2024-12-23 | End: 2024-12-30

## 2024-12-23 ASSESSMENT — ENCOUNTER SYMPTOMS
DIARRHEA: 0
WHEEZING: 0
VOICE CHANGE: 0
CONSTIPATION: 0
COUGH: 1
SINUS PAIN: 0
CHEST TIGHTNESS: 0
ABDOMINAL PAIN: 0
SHORTNESS OF BREATH: 0
EYE REDNESS: 1
EYE DISCHARGE: 1
BLOOD IN STOOL: 0
NAUSEA: 0

## 2024-12-23 NOTE — PROGRESS NOTES
ofloxacin (OCUFLOX) 0.3 % solution     Sig: Place 1 drop into both eyes 4 times daily for 10 days     Dispense:  5 mL     Refill:  1        Subjective        Subjective    HPI:  Patient: Izabella Cheung is a 54 y.o. female   History of Present Illness  The patient presents for evaluation of left eye conjunctivitis and upper respiratory symptoms.    She reported awakening with a goopy left eye, which she managed to clean with warm water. Her  was diagnosed with pinkeye yesterday and has been using prescribed eye drops. She expressed concern about the potential spread of the infection to her right eye. She does not experience any itching in the affected eye. She had to cancel an appointment with her ophthalmologist today due to this issue.    Additionally, she reported a productive cough with brown phlegm since Thursday. Despite the cough, she retained her sense of taste and smell. She did not report any nasal drainage but mentioned chest discomfort and mild hoarseness. She did not have a fever. Her primary care physician is Dr. Foreman.    Supplemental Information  She did not require any refills for her depression medication.       Review of Systems   Constitutional:  Negative for appetite change, chills, diaphoresis, fatigue and unexpected weight change.   HENT:  Positive for congestion. Negative for hearing loss, sinus pain and voice change.    Eyes:  Positive for discharge and redness. Negative for visual disturbance.   Respiratory:  Positive for cough. Negative for chest tightness, shortness of breath and wheezing.    Cardiovascular:  Negative for chest pain, palpitations and leg swelling.   Gastrointestinal:  Negative for abdominal pain, blood in stool, constipation, diarrhea and nausea.   Endocrine: Negative for cold intolerance, heat intolerance and polyuria.   Genitourinary:  Negative for difficulty urinating, dysuria, hematuria, menstrual problem, pelvic pain and vaginal discharge.   Musculoskeletal:

## 2025-06-23 ASSESSMENT — PATIENT HEALTH QUESTIONNAIRE - PHQ9
SUM OF ALL RESPONSES TO PHQ QUESTIONS 1-9: 0
SUM OF ALL RESPONSES TO PHQ QUESTIONS 1-9: 0
1. LITTLE INTEREST OR PLEASURE IN DOING THINGS: NOT AT ALL
SUM OF ALL RESPONSES TO PHQ QUESTIONS 1-9: 0
2. FEELING DOWN, DEPRESSED OR HOPELESS: NOT AT ALL
SUM OF ALL RESPONSES TO PHQ QUESTIONS 1-9: 0
2. FEELING DOWN, DEPRESSED OR HOPELESS: NOT AT ALL
SUM OF ALL RESPONSES TO PHQ9 QUESTIONS 1 & 2: 0
1. LITTLE INTEREST OR PLEASURE IN DOING THINGS: NOT AT ALL

## 2025-06-26 ENCOUNTER — HOSPITAL ENCOUNTER (OUTPATIENT)
Dept: LAB | Age: 55
Discharge: HOME | End: 2025-06-26

## 2025-06-26 ENCOUNTER — OFFICE VISIT (OUTPATIENT)
Dept: FAMILY MEDICINE CLINIC | Age: 55
End: 2025-06-26
Payer: COMMERCIAL

## 2025-06-26 ENCOUNTER — RESULTS FOLLOW-UP (OUTPATIENT)
Dept: FAMILY MEDICINE CLINIC | Age: 55
End: 2025-06-26

## 2025-06-26 VITALS
HEART RATE: 70 BPM | HEIGHT: 62 IN | DIASTOLIC BLOOD PRESSURE: 80 MMHG | WEIGHT: 158.6 LBS | OXYGEN SATURATION: 97 % | SYSTOLIC BLOOD PRESSURE: 130 MMHG | BODY MASS INDEX: 29.19 KG/M2

## 2025-06-26 DIAGNOSIS — R73.01 ELEVATED FASTING GLUCOSE: Primary | ICD-10-CM

## 2025-06-26 DIAGNOSIS — Z12.31 ENCOUNTER FOR SCREENING MAMMOGRAM FOR MALIGNANT NEOPLASM OF BREAST: ICD-10-CM

## 2025-06-26 DIAGNOSIS — Z00.00 ANNUAL PHYSICAL EXAM: ICD-10-CM

## 2025-06-26 DIAGNOSIS — N95.1 PERIMENOPAUSAL: ICD-10-CM

## 2025-06-26 DIAGNOSIS — Z00.00 ANNUAL PHYSICAL EXAM: Primary | ICD-10-CM

## 2025-06-26 LAB
ALBUMIN SERPL-MCNC: 4.5 G/DL (ref 3.5–4.6)
ALP SERPL-CCNC: 80 U/L (ref 40–130)
ALT SERPL-CCNC: 25 U/L (ref 0–33)
ANION GAP SERPL CALCULATED.3IONS-SCNC: 10 MEQ/L (ref 9–15)
AST SERPL-CCNC: 21 U/L (ref 0–35)
BILIRUB SERPL-MCNC: 0.3 MG/DL (ref 0.2–0.7)
BUN SERPL-MCNC: 14 MG/DL (ref 6–20)
CALCIUM SERPL-MCNC: 9 MG/DL (ref 8.5–9.9)
CHLORIDE SERPL-SCNC: 102 MEQ/L (ref 95–107)
CHOLEST SERPL-MCNC: 193 MG/DL (ref 0–199)
CO2 SERPL-SCNC: 24 MEQ/L (ref 20–31)
CREAT SERPL-MCNC: 0.58 MG/DL (ref 0.5–0.9)
GLOBULIN SER CALC-MCNC: 2.3 G/DL (ref 2.3–3.5)
GLUCOSE FASTING: 111 MG/DL (ref 70–99)
HDLC SERPL-MCNC: 59 MG/DL (ref 40–59)
LDL CHOLESTEROL: 118 MG/DL (ref 0–129)
POTASSIUM SERPL-SCNC: 4.8 MEQ/L (ref 3.4–4.9)
PROT SERPL-MCNC: 6.8 G/DL (ref 6.3–8)
SODIUM SERPL-SCNC: 136 MEQ/L (ref 135–144)
TRIGLYCERIDE, FASTING: 78 MG/DL (ref 0–150)

## 2025-06-26 PROCEDURE — 99396 PREV VISIT EST AGE 40-64: CPT | Performed by: FAMILY MEDICINE

## 2025-06-26 RX ORDER — ESCITALOPRAM OXALATE 20 MG/1
20 TABLET ORAL DAILY
Qty: 90 TABLET | Refills: 3 | Status: SHIPPED | OUTPATIENT
Start: 2025-06-26

## 2025-06-26 SDOH — ECONOMIC STABILITY: FOOD INSECURITY: WITHIN THE PAST 12 MONTHS, THE FOOD YOU BOUGHT JUST DIDN'T LAST AND YOU DIDN'T HAVE MONEY TO GET MORE.: NEVER TRUE

## 2025-06-26 SDOH — ECONOMIC STABILITY: FOOD INSECURITY: WITHIN THE PAST 12 MONTHS, YOU WORRIED THAT YOUR FOOD WOULD RUN OUT BEFORE YOU GOT MONEY TO BUY MORE.: NEVER TRUE

## 2025-06-26 NOTE — PROGRESS NOTES
[facility] PRIMARY CARE  [address] [place] [id number]  Dept: [phone]  Dept Fax: [phone]     Chief Complaint:  Chief Complaint   Patient presents with    Annual Exam       Vitals:    06/26/25 1050   BP: 130/80   Pulse: 70   SpO2: 97%   Weight: 71.9 kg (158 lb 9.6 oz)   Height: 1.562 m (5' 1.5\")       HPI:  54 y.o.female who presents for the following:      Works as a ; sees gyn for PAPs and Mamms; quit smoking 2010    -----------------------------------------------------------------------------    Assessment/Plan:  54 y.o. female here mainly for the following:  Annual  routine labs and screenings         ICD-10-CM    1. Annual physical exam  Z00.00 Lipid, Fasting     Comprehensive Metabolic Panel, Fasting      2. Encounter for screening mammogram for malignant neoplasm of breast  Z12.31 OTILIO DANAY DIGITAL SCREEN BILATERAL      3. Perimenopausal  N95.1 escitalopram (LEXAPRO) 20 MG tablet          Return in about 1 year (around 6/26/2026) for annual exam.    Isauro Foreman MD      -----------------------------------------------------------------------------      Objective     Physical Exam:  Physical Exam  Vitals reviewed.   Constitutional:       General: She is not in acute distress.     Appearance: She is well-developed.   HENT:      Head: Normocephalic and atraumatic.      Right Ear: Tympanic membrane, ear canal and external ear normal. Tympanic membrane is not erythematous. Tympanic membrane has normal mobility.      Left Ear: Tympanic membrane, ear canal and external ear normal. Tympanic membrane is not erythematous. Tympanic membrane has normal mobility.      Nose: Nose normal.      Mouth/Throat:      Pharynx: No oropharyngeal exudate.   Neck:      Thyroid: No thyromegaly.   Cardiovascular:      Rate and Rhythm: Normal rate and regular rhythm.      Heart sounds: Normal heart sounds. No murmur heard.  Pulmonary:      Effort: Pulmonary effort is normal. No

## 2025-06-27 ENCOUNTER — HOSPITAL ENCOUNTER (OUTPATIENT)
Dept: LAB | Age: 55
Discharge: HOME | End: 2025-06-27
Payer: COMMERCIAL

## 2025-06-27 DIAGNOSIS — R73.01 ELEVATED FASTING GLUCOSE: ICD-10-CM

## 2025-06-27 LAB
ESTIMATED AVERAGE GLUCOSE: 108 MG/DL
HBA1C MFR BLD: 5.4 % (ref 4–6)

## 2025-06-27 PROCEDURE — 36415 COLL VENOUS BLD VENIPUNCTURE: CPT

## 2025-06-27 PROCEDURE — 83036 HEMOGLOBIN GLYCOSYLATED A1C: CPT

## 2025-06-29 ENCOUNTER — RESULTS FOLLOW-UP (OUTPATIENT)
Dept: FAMILY MEDICINE CLINIC | Age: 55
End: 2025-06-29